# Patient Record
Sex: MALE | Race: WHITE | Employment: OTHER | URBAN - METROPOLITAN AREA
[De-identification: names, ages, dates, MRNs, and addresses within clinical notes are randomized per-mention and may not be internally consistent; named-entity substitution may affect disease eponyms.]

---

## 2021-06-11 ENCOUNTER — OFFICE VISIT (OUTPATIENT)
Dept: ORTHOPEDIC SURGERY | Age: 66
End: 2021-06-11
Payer: MEDICARE

## 2021-06-11 VITALS — BODY MASS INDEX: 24.66 KG/M2 | HEIGHT: 65 IN | WEIGHT: 148 LBS

## 2021-06-11 DIAGNOSIS — M17.12 OSTEOARTHRITIS OF LEFT KNEE, UNSPECIFIED OSTEOARTHRITIS TYPE: ICD-10-CM

## 2021-06-11 DIAGNOSIS — M25.561 PAIN IN BOTH KNEES, UNSPECIFIED CHRONICITY: Primary | ICD-10-CM

## 2021-06-11 DIAGNOSIS — M17.11 OSTEOARTHRITIS OF RIGHT KNEE, UNSPECIFIED OSTEOARTHRITIS TYPE: ICD-10-CM

## 2021-06-11 DIAGNOSIS — M25.562 PAIN IN BOTH KNEES, UNSPECIFIED CHRONICITY: Primary | ICD-10-CM

## 2021-06-11 PROCEDURE — G8536 NO DOC ELDER MAL SCRN: HCPCS | Performed by: ORTHOPAEDIC SURGERY

## 2021-06-11 PROCEDURE — G8427 DOCREV CUR MEDS BY ELIG CLIN: HCPCS | Performed by: ORTHOPAEDIC SURGERY

## 2021-06-11 PROCEDURE — 99203 OFFICE O/P NEW LOW 30 MIN: CPT | Performed by: ORTHOPAEDIC SURGERY

## 2021-06-11 PROCEDURE — 1101F PT FALLS ASSESS-DOCD LE1/YR: CPT | Performed by: ORTHOPAEDIC SURGERY

## 2021-06-11 PROCEDURE — G8420 CALC BMI NORM PARAMETERS: HCPCS | Performed by: ORTHOPAEDIC SURGERY

## 2021-06-11 PROCEDURE — 3017F COLORECTAL CA SCREEN DOC REV: CPT | Performed by: ORTHOPAEDIC SURGERY

## 2021-06-11 PROCEDURE — G8510 SCR DEP NEG, NO PLAN REQD: HCPCS | Performed by: ORTHOPAEDIC SURGERY

## 2021-06-11 PROCEDURE — 20611 DRAIN/INJ JOINT/BURSA W/US: CPT | Performed by: ORTHOPAEDIC SURGERY

## 2021-06-11 RX ORDER — TRIAMCINOLONE ACETONIDE 40 MG/ML
40 INJECTION, SUSPENSION INTRA-ARTICULAR; INTRAMUSCULAR ONCE
Status: COMPLETED | OUTPATIENT
Start: 2021-06-11 | End: 2021-06-11

## 2021-06-11 RX ORDER — LIDOCAINE HYDROCHLORIDE 10 MG/ML
9 INJECTION INFILTRATION; PERINEURAL ONCE
Status: COMPLETED | OUTPATIENT
Start: 2021-06-11 | End: 2021-06-11

## 2021-06-11 RX ADMIN — LIDOCAINE HYDROCHLORIDE 9 ML: 10 INJECTION INFILTRATION; PERINEURAL at 09:36

## 2021-06-11 RX ADMIN — TRIAMCINOLONE ACETONIDE 40 MG: 40 INJECTION, SUSPENSION INTRA-ARTICULAR; INTRAMUSCULAR at 09:37

## 2021-06-11 NOTE — LETTER
Robby Main 1955  
[id number]  
 
 
6/11/2021 I hereby authorize and direct Channing Springer MD, Fernando Feliz, and whomever he may designate as his associate to perform upon myself the following procedure: 
 
Injection of: Kenalog, Supartz, Euflexxa, Orthovisc in the Right/Left ____________________. If any unforeseen condition arises in the course of the procedure, I further authorize him and his associated and/or assistant(s) to do whatever he/she deems advisable. The nature, purpose, benefits, risks, side effects, likelihood of achieving goals, and potential problems that might occur during recuperation, risks for not receiving the proposed care, treatment and services and alternatives of the procedure have been fully explained to me by my physician including, but not limited to: 
 
Swelling, joint pain, skin pigment changes, worsening of condition, and failure to improve. I acknowledge that no guarantee or assurance has been made to me as to the results that may be obtained or the likelihood of success. _______________________________________ Signature of patient or authorized representative [employer] [facility] fax: [phone]

## 2021-06-11 NOTE — PROGRESS NOTES
Name: Jared Hoffman    : 1955     Service Dept: [facility]    Patient's Pharmacies:    [facility] [address]  [address]  [address]  Phone: [phone] Fax: [phone]       Chief Complaint   Patient presents with    Knee Pain        Visit Vitals  Ht 5' 5\" (1.651 m)   Wt 148 lb (67.1 kg)   BMI 24.63 kg/m²      No Known Allergies      There is no problem list on file for this patient. Family History   Problem Relation Age of Onset    Diabetes Mother       Social History     Socioeconomic History    Marital status: UNKNOWN     Spouse name: Not on file    Number of children: Not on file    Years of education: Not on file    Highest education level: Not on file   Tobacco Use    Smoking status: Never Smoker    Smokeless tobacco: Never Used   Substance and Sexual Activity    Alcohol use: Yes     Social Determinants of Health     Financial Resource Strain:     Difficulty of Paying Living Expenses:    Food Insecurity:     Worried About Running Out of Food in the Last Year:     [address] in the Last Year:    Transportation Needs:     Lack of Transportation (Medical):  Lack of Transportation (Non-Medical):    Physical Activity:     Days of Exercise per Week:     Minutes of Exercise per Session:    Stress:     Feeling of Stress :    Social Connections:     Frequency of Communication with Friends and Family:     Frequency of Social Gatherings with Friends and Family:     Attends Religion Services:     Active Member of Clubs or Organizations:     Attends Club or Organization Meetings:     Marital Status:       Past Surgical History:   Procedure Laterality Date    HX KNEE ARTHROSCOPY        History reviewed. No pertinent past medical history.      I have reviewed and agree with PFSH and ROS and intake form in chart and the record furthermore I have reviewed prior medical record(s) regarding this patients care during this appointment. Review of Systems:   Patient is a pleasant appearing individual, appropriately dressed, well hydrated, well nourished, who is alert, appropriately oriented for age, and in no acute distress with a normal gait and normal affect who does not appear to be in any significant pain. Physical Exam:  Left Knee -Decrease range of motion with flexion, Knee arc of greater than 50 degrees, Some crepitation, Grossly neurovascularly intact, Good cap refill, No skin lesion, Moderate swelling, No gross instability, Some quadriceps weakness Kellgren and Bo at least grade 3    Right Knee -Decrease range of motion with flexion, Some crepitation, Grossly neurovascularly intact, Good cap refill, No skin lesion, Moderate swelling, No gross instability, Some quadriceps weaknessKellgren and Bo at least grade 3    Procedure Documentation:    I discussed in detail the risks, benefits and complications of an injection which included but are not limited to infection, skin reactions, hot swollen joint, and anaphylaxis with the patient. The patient verbalized understanding and gave informed consent for the injection. The patient's knees were flexed to 90° and the skin prepped using sterile alcohol solution. A sterile needle was inserted into the bilateral knee(s) and the mixture of 9 mL Lidocaine 1%, 1 mL Kenalog 40 mg was injected under sterile technique. The needle was withdrawn and the puncture site sealed with a Band-Aid. Technique: Under sterile conditions a [employer] ultrasound unit with a variable frequency (7.0-14.0 MHz) linear transducer was used to localize the placement of needle into the bilateral knee(s) joint. Findings: Successful needle placement for knee injection. Final images were taken and saved for permanent record. The patient tolerated the injection well.  The patient was instructed to call the office immediately if there is any pain, redness, warmth, fever, or chills. Encounter Diagnoses     ICD-10-CM ICD-9-CM   1. Pain in both knees, unspecified chronicity  M25.561 719.46    M25.562    2. Osteoarthritis of right knee, unspecified osteoarthritis type  M17.11 715.96   3. Osteoarthritis of left knee, unspecified osteoarthritis type  M17.12 715.96       HPI:  The patient is here with a chief complaint of bilateral knee pain, throbbing burning pain, progressively getting worse. Pain is 6/10. ROS:  10-point review of systems is positive for locking and swelling. X-rays are positive for moderate to almost severe OA, especially in the right knee. Assessment/Plan:  1. Bilateral knee OA. Plan would be for cortisone injection in both knees. See him back in 4 weeks. If not better, we may talk to him about knee replacement and go from there. As part of continued conservative pain management options the patient was advised to utilize Tylenol or OTC NSAIDS as long as it is not medically contraindicated. Return to Office: Follow-up and Dispositions    · Return in about 4 weeks (around 7/9/2021). Administrations This Visit     lidocaine (XYLOCAINE) 10 mg/mL (1 %) injection 9 mL     Admin Date  06/11/2021 Action  Given Dose  9 mL Route  Other Administered By  Wing Carroll LPN    Admin Date  76/19/2737 Action  Given Dose  9 mL Route  Other Administered By  Wing Carroll LPN          triamcinolone acetonide (KENALOG-40) 40 mg/mL injection 40 mg     Admin Date  06/11/2021 Action  Given Dose  40 mg Route  Intra artICUlar Administered By  Wing Carroll LPN    Admin Date  74/48/3755 Action  Given Dose  40 mg Route  Intra artICUlar Administered By  Wing Carroll LPN               Scribed by Yani Escamilla LPN as dictated by [facility] DEACON Mcelroy MD.  Documentation True and Accepted Channing Mcelroy MD

## 2021-07-30 ENCOUNTER — OFFICE VISIT (OUTPATIENT)
Dept: ORTHOPEDIC SURGERY | Age: 66
End: 2021-07-30
Payer: MEDICARE

## 2021-07-30 VITALS — BODY MASS INDEX: 24.66 KG/M2 | WEIGHT: 148 LBS | HEIGHT: 65 IN

## 2021-07-30 DIAGNOSIS — Z01.818 PREOP TESTING: Primary | ICD-10-CM

## 2021-07-30 DIAGNOSIS — Z01.818 PREOP TESTING: ICD-10-CM

## 2021-07-30 DIAGNOSIS — M17.0 BILATERAL PRIMARY OSTEOARTHRITIS OF KNEE: ICD-10-CM

## 2021-07-30 PROCEDURE — G8536 NO DOC ELDER MAL SCRN: HCPCS | Performed by: ORTHOPAEDIC SURGERY

## 2021-07-30 PROCEDURE — G8420 CALC BMI NORM PARAMETERS: HCPCS | Performed by: ORTHOPAEDIC SURGERY

## 2021-07-30 PROCEDURE — G8427 DOCREV CUR MEDS BY ELIG CLIN: HCPCS | Performed by: ORTHOPAEDIC SURGERY

## 2021-07-30 PROCEDURE — G8432 DEP SCR NOT DOC, RNG: HCPCS | Performed by: ORTHOPAEDIC SURGERY

## 2021-07-30 PROCEDURE — 3017F COLORECTAL CA SCREEN DOC REV: CPT | Performed by: ORTHOPAEDIC SURGERY

## 2021-07-30 PROCEDURE — 99214 OFFICE O/P EST MOD 30 MIN: CPT | Performed by: ORTHOPAEDIC SURGERY

## 2021-07-30 PROCEDURE — 1101F PT FALLS ASSESS-DOCD LE1/YR: CPT | Performed by: ORTHOPAEDIC SURGERY

## 2021-07-30 NOTE — PATIENT INSTRUCTIONS
Knee Pain or Injury: Care Instructions  Your Care Instructions     Injuries are a common cause of knee problems. Sudden (acute) injuries may be caused by a direct blow to the knee. They can also be caused by abnormal twisting, bending, or falling on the knee. Pain, bruising, or swelling may be severe, and may start within minutes of the injury. Overuse is another cause of knee pain. Other causes are climbing stairs, kneeling, and other activities that use the knee. Everyday wear and tear, especially as you get older, also can cause knee pain. Rest, along with home treatment, often relieves pain and allows your knee to heal. If you have a serious knee injury, you may need tests and treatment. Follow-up care is a key part of your treatment and safety. Be sure to make and go to all appointments, and call your doctor if you are having problems. It's also a good idea to know your test results and keep a list of the medicines you take. How can you care for yourself at home? · Be safe with medicines. Read and follow all instructions on the label. ? If the doctor gave you a prescription medicine for pain, take it as prescribed. ? If you are not taking a prescription pain medicine, ask your doctor if you can take an over-the-counter medicine. · Rest and protect your knee. Take a break from any activity that may cause pain. · Put ice or a cold pack on your knee for 10 to 20 minutes at a time. Put a thin cloth between the ice and your skin. · Prop up a sore knee on a pillow when you ice it or anytime you sit or lie down for the next 3 days. Try to keep it above the level of your heart. This will help reduce swelling. · If your knee is not swollen, you can put moist heat, a heating pad, or a warm cloth on your knee. · If your doctor recommends an elastic bandage, sleeve, or other type of support for your knee, wear it as directed.   · Follow your doctor's instructions about how much weight you can put on your leg. Use a cane, crutches, or a walker as instructed. · Follow your doctor's instructions about activity during your healing process. If you can do mild exercise, slowly increase your activity. · Reach and stay at a healthy weight. Extra weight can strain the joints, especially the knees and hips, and make the pain worse. Losing even a few pounds may help. When should you call for help? Call 911 anytime you think you may need emergency care. For example, call if:    · You have symptoms of a blood clot in your lung (called a pulmonary embolism). These may include:  ? Sudden chest pain. ? Trouble breathing. ? Coughing up blood. Call your doctor now or seek immediate medical care if:    · You have severe or increasing pain.     · Your leg or foot turns cold or changes color.     · You cannot stand or put weight on your knee.     · Your knee looks twisted or bent out of shape.     · You cannot move your knee.     · You have signs of infection, such as:  ? Increased pain, swelling, warmth, or redness. ? Red streaks leading from the knee. ? Pus draining from a place on your knee. ? A fever.     · You have signs of a blood clot in your leg (called a deep vein thrombosis), such as:  ? Pain in your calf, back of the knee, thigh, or groin. ? Redness and swelling in your leg or groin. Watch closely for changes in your health, and be sure to contact your doctor if:    · You have tingling, weakness, or numbness in your knee.     · You have any new symptoms, such as swelling.     · You have bruises from a knee injury that last longer than 2 weeks.     · You do not get better as expected. Where can you learn more? Go to http://www.gray.com/  Enter K195 in the search box to learn more about \"Knee Pain or Injury: Care Instructions. \"  Current as of: February 26, 2020               Content Version: 12.8  © 8492-7961 [employer].    Care instructions adapted under license by [employer] (which disclaims liability or warranty for this information). If you have questions about a medical condition or this instruction, always ask your healthcare professional. [address] any warranty or liability for your use of this information.

## 2021-07-30 NOTE — PROGRESS NOTES
Name: Cass Pierce    : 1955     Service Dept: [facility]    Patient's Pharmacies:    [facility] [address]  [address] [address]  Phone: [phone] Fax: [phone]       Chief Complaint   Patient presents with    Knee Pain     b/l        Visit Vitals  Ht 5' 5\" (1.651 m)   Wt 148 lb (67.1 kg)   BMI 24.63 kg/m²      No Known Allergies      There is no problem list on file for this patient. Family History   Problem Relation Age of Onset    Diabetes Mother       Social History     Socioeconomic History    Marital status:      Spouse name: Not on file    Number of children: Not on file    Years of education: Not on file    Highest education level: Not on file   Tobacco Use    Smoking status: Never Smoker    Smokeless tobacco: Never Used   Substance and Sexual Activity    Alcohol use: Yes     Social Determinants of Health     Financial Resource Strain:     Difficulty of Paying Living Expenses:    Food Insecurity:     Worried About Running Out of Food in the Last Year:     [address] in the Last Year:    Transportation Needs:     Lack of Transportation (Medical):  Lack of Transportation (Non-Medical):    Physical Activity:     Days of Exercise per Week:     Minutes of Exercise per Session:    Stress:     Feeling of Stress :    Social Connections:     Frequency of Communication with Friends and Family:     Frequency of Social Gatherings with Friends and Family:     Attends Hinduism Services:     Active Member of Clubs or Organizations:     Attends Club or Organization Meetings:     Marital Status:       Past Surgical History:   Procedure Laterality Date    HX KNEE ARTHROSCOPY        No past medical history on file.      I have reviewed and agree with PFSH and ROS and intake form in chart and the record furthermore I have reviewed prior medical record(s) regarding this patients care during this appointment. Review of Systems:   Patient is a pleasant appearing individual, appropriately dressed, well hydrated, well nourished, who is alert, appropriately oriented for age, and in no acute distress with a normal gait and normal affect who does not appear to be in any significant pain. Physical Exam:  Left Knee -Decrease range of motion with flexion, Knee arc of greater than 50 degrees, Some crepitation, Grossly neurovascularly intact, Good cap refill, No skin lesion, Moderate swelling, No gross instability, Some quadriceps weakness Kellgren and Bo at least grade 3    Right Knee -Decrease range of motion with flexion, Some crepitation, Grossly neurovascularly intact, Good cap refill, No skin lesion, Moderate swelling, No gross instability, Some quadriceps weaknessKellgren and Bo at least grade 3   Encounter Diagnoses     ICD-10-CM ICD-9-CM   1. Preop testing  Z01.818 V72.84   2. Bilateral primary osteoarthritis of knee  M17.0 715.16       HPI:  The patient is here with a chief complaint of bilateral knee pain. Post x-rays which shows he has got moderate-to-severe OA. We gave a cortisone injection, and it is only transient. Assessment/Plan:  Plan would be for right total knee replacement, general medical and cardiac clearance, outpatient surgery, and go from there. As part of continued conservative pain management options the patient was advised to utilize Tylenol or OTC NSAIDS as long as it is not medically contraindicated. Return to Office:         Scribed by Royce Cochran as dictated by [facility] [place] DEACON Griffin MD.  Documentation True and Accepted Channing Griffin MD

## 2021-08-10 ENCOUNTER — TELEPHONE (OUTPATIENT)
Dept: INTERNAL MEDICINE CLINIC | Age: 66
End: 2021-08-10

## 2021-08-10 NOTE — TELEPHONE ENCOUNTER
Long time pt of Britney Styles is requesting to establish care with her at new practice . Says he is getting knee surgery in November and would like for her to do the pre op.  He wants know if he can do the establish care visit and pro op visit all in one

## 2021-09-22 LAB — CREATININE, EXTERNAL: 0.6

## 2021-10-11 DIAGNOSIS — Z01.818 PREOP TESTING: ICD-10-CM

## 2021-10-11 DIAGNOSIS — M17.0 BILATERAL PRIMARY OSTEOARTHRITIS OF KNEE: ICD-10-CM

## 2021-10-11 PROCEDURE — 93010 ELECTROCARDIOGRAM REPORT: CPT | Performed by: ORTHOPAEDIC SURGERY

## 2021-11-01 ENCOUNTER — OFFICE VISIT (OUTPATIENT)
Dept: INTERNAL MEDICINE CLINIC | Age: 66
End: 2021-11-01
Payer: MEDICARE

## 2021-11-01 VITALS
OXYGEN SATURATION: 97 % | SYSTOLIC BLOOD PRESSURE: 148 MMHG | WEIGHT: 152 LBS | DIASTOLIC BLOOD PRESSURE: 90 MMHG | TEMPERATURE: 98.2 F | HEIGHT: 65 IN | HEART RATE: 77 BPM | BODY MASS INDEX: 25.33 KG/M2 | RESPIRATION RATE: 16 BRPM

## 2021-11-01 DIAGNOSIS — F10.10 EXCESSIVE DRINKING OF ALCOHOL: ICD-10-CM

## 2021-11-01 DIAGNOSIS — R03.0 ELEVATED BLOOD PRESSURE READING: ICD-10-CM

## 2021-11-01 DIAGNOSIS — N52.8 OTHER MALE ERECTILE DYSFUNCTION: ICD-10-CM

## 2021-11-01 DIAGNOSIS — R23.3 EASY BRUISING: ICD-10-CM

## 2021-11-01 DIAGNOSIS — Z01.818 PREOPERATIVE EVALUATION TO RULE OUT SURGICAL CONTRAINDICATION: Primary | ICD-10-CM

## 2021-11-01 DIAGNOSIS — E78.00 PURE HYPERCHOLESTEROLEMIA: ICD-10-CM

## 2021-11-01 DIAGNOSIS — G47.00 INSOMNIA, UNSPECIFIED TYPE: ICD-10-CM

## 2021-11-01 DIAGNOSIS — Z12.5 SCREENING FOR PROSTATE CANCER: ICD-10-CM

## 2021-11-01 PROCEDURE — G8432 DEP SCR NOT DOC, RNG: HCPCS | Performed by: INTERNAL MEDICINE

## 2021-11-01 PROCEDURE — 3017F COLORECTAL CA SCREEN DOC REV: CPT | Performed by: INTERNAL MEDICINE

## 2021-11-01 PROCEDURE — 99215 OFFICE O/P EST HI 40 MIN: CPT | Performed by: INTERNAL MEDICINE

## 2021-11-01 PROCEDURE — G8536 NO DOC ELDER MAL SCRN: HCPCS | Performed by: INTERNAL MEDICINE

## 2021-11-01 PROCEDURE — G0463 HOSPITAL OUTPT CLINIC VISIT: HCPCS | Performed by: INTERNAL MEDICINE

## 2021-11-01 PROCEDURE — G8419 CALC BMI OUT NRM PARAM NOF/U: HCPCS | Performed by: INTERNAL MEDICINE

## 2021-11-01 PROCEDURE — 1101F PT FALLS ASSESS-DOCD LE1/YR: CPT | Performed by: INTERNAL MEDICINE

## 2021-11-01 PROCEDURE — G8427 DOCREV CUR MEDS BY ELIG CLIN: HCPCS | Performed by: INTERNAL MEDICINE

## 2021-11-01 RX ORDER — ASPIRIN 81 MG/1
81 TABLET ORAL DAILY
COMMUNITY
End: 2022-02-03
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
## 2021-11-01 NOTE — PROGRESS NOTES
Thuan Code male 77 y.o.       1. Have you been to the ER, urgent care clinic since your last visit? Hospitalized since your last visit? No    2. Have you seen or consulted any other health care providers outside of the [address] [place] [address] since your last visit? Include any pap smears or colon screening.  Yes When: Nov 18 Dr Kenna Lizarraga Replacement

## 2021-11-02 ENCOUNTER — TELEPHONE (OUTPATIENT)
Dept: INTERNAL MEDICINE CLINIC | Age: 66
End: 2021-11-02

## 2021-11-02 DIAGNOSIS — N52.8 OTHER MALE ERECTILE DYSFUNCTION: Primary | ICD-10-CM

## 2021-11-02 RX ORDER — SILDENAFIL 25 MG/1
TABLET, FILM COATED ORAL
Qty: 30 TABLET | Refills: 5 | Status: SHIPPED | OUTPATIENT
Start: 2021-11-02 | End: 2022-02-03

## 2021-11-02 NOTE — TELEPHONE ENCOUNTER
----- Message from Bebe Rahman sent at 11/2/2021 10:48 AM EDT -----  Patient requesting medication refill filbenafil 20mg [facility] [phone]. .. [facility]

## 2021-11-02 NOTE — TELEPHONE ENCOUNTER
Please let his ortho Dr. Zach Carrillo office know his preop clearance note has been done and is in system.

## 2021-11-02 NOTE — TELEPHONE ENCOUNTER
Please advise and pend new Rx if appropriate.     Last visit:  11/1/21 with MD Jim Inman  Next appt:  2/3/22 with MD Jim Inman

## 2021-11-08 DIAGNOSIS — M17.11 OSTEOARTHRITIS OF RIGHT KNEE, UNSPECIFIED OSTEOARTHRITIS TYPE: Primary | ICD-10-CM

## 2021-11-11 ENCOUNTER — OFFICE VISIT (OUTPATIENT)
Dept: ORTHOPEDIC SURGERY | Age: 66
End: 2021-11-11
Payer: MEDICARE

## 2021-11-11 ENCOUNTER — HOSPITAL ENCOUNTER (OUTPATIENT)
Dept: PREADMISSION TESTING | Age: 66
Discharge: HOME | End: 2021-11-11
Payer: MEDICARE

## 2021-11-11 ENCOUNTER — ANESTHESIA EVENT (OUTPATIENT)
Dept: SURGERY | Age: 66
End: 2021-11-11
Payer: MEDICARE

## 2021-11-11 DIAGNOSIS — M17.11 PRIMARY OSTEOARTHRITIS OF RIGHT KNEE: Primary | ICD-10-CM

## 2021-11-11 LAB — SARS-COV-2, COV2: NORMAL

## 2021-11-11 PROCEDURE — 1101F PT FALLS ASSESS-DOCD LE1/YR: CPT | Performed by: ORTHOPAEDIC SURGERY

## 2021-11-11 PROCEDURE — 99214 OFFICE O/P EST MOD 30 MIN: CPT | Performed by: ORTHOPAEDIC SURGERY

## 2021-11-11 PROCEDURE — 3017F COLORECTAL CA SCREEN DOC REV: CPT | Performed by: ORTHOPAEDIC SURGERY

## 2021-11-11 PROCEDURE — G8427 DOCREV CUR MEDS BY ELIG CLIN: HCPCS | Performed by: ORTHOPAEDIC SURGERY

## 2021-11-11 PROCEDURE — U0003 INFECTIOUS AGENT DETECTION BY NUCLEIC ACID (DNA OR RNA); SEVERE ACUTE RESPIRATORY SYNDROME CORONAVIRUS 2 (SARS-COV-2) (CORONAVIRUS DISEASE [COVID-19]), AMPLIFIED PROBE TECHNIQUE, MAKING USE OF HIGH THROUGHPUT TECHNOLOGIES AS DESCRIBED BY CMS-2020-01-R: HCPCS

## 2021-11-11 PROCEDURE — G8536 NO DOC ELDER MAL SCRN: HCPCS | Performed by: ORTHOPAEDIC SURGERY

## 2021-11-11 PROCEDURE — G8432 DEP SCR NOT DOC, RNG: HCPCS | Performed by: ORTHOPAEDIC SURGERY

## 2021-11-11 PROCEDURE — G8419 CALC BMI OUT NRM PARAM NOF/U: HCPCS | Performed by: ORTHOPAEDIC SURGERY

## 2021-11-11 RX ORDER — ONDANSETRON 4 MG/1
4 TABLET, FILM COATED ORAL
Qty: 10 TABLET | Refills: 0 | Status: SHIPPED | OUTPATIENT
Start: 2021-11-11 | End: 2022-02-03

## 2021-11-11 RX ORDER — SODIUM CHLORIDE 0.9 % (FLUSH) 0.9 %
5-40 SYRINGE (ML) INJECTION EVERY 8 HOURS
Status: CANCELLED | OUTPATIENT
Start: 2021-11-11

## 2021-11-11 RX ORDER — CEPHALEXIN 500 MG/1
500 CAPSULE ORAL EVERY 8 HOURS
Qty: 9 CAPSULE | Refills: 0 | Status: SHIPPED | OUTPATIENT
Start: 2021-11-11 | End: 2021-11-14

## 2021-11-11 RX ORDER — OXYCODONE AND ACETAMINOPHEN 5; 325 MG/1; MG/1
1 TABLET ORAL
Qty: 30 TABLET | Refills: 0 | Status: SHIPPED | OUTPATIENT
Start: 2021-11-11 | End: 2021-11-25

## 2021-11-11 RX ORDER — AMOXICILLIN 500 MG/1
500 CAPSULE ORAL 3 TIMES DAILY
COMMUNITY
Start: 2021-09-08 | End: 2022-02-03 | Stop reason: ALTCHOICE

## 2021-11-11 RX ORDER — CEPHALEXIN 500 MG/1
500 CAPSULE ORAL 4 TIMES DAILY
Qty: 28 CAPSULE | Refills: 0 | Status: ON HOLD | OUTPATIENT
Start: 2021-11-11 | End: 2021-11-18 | Stop reason: SDUPTHER

## 2021-11-11 RX ORDER — INSULIN LISPRO 100 [IU]/ML
INJECTION, SOLUTION INTRAVENOUS; SUBCUTANEOUS ONCE
Status: CANCELLED | OUTPATIENT
Start: 2021-11-11 | End: 2021-11-11

## 2021-11-11 NOTE — LETTER
11/12/2021    Patient: Kasia Burnette   YOB: 1955   Date of Visit: 11/11/2021     Emelia Morales MD  [address] [facility]  [address]  [address] [place] [address]  Via In Basket    Dear Emelia Morales MD,      Thank you for referring Mr. Tyson Call to [address]  for evaluation. My notes for this consultation are attached. If you have questions, please do not hesitate to call me. I look forward to following your patient along with you.       Sincerely,    Bridget Henson MD

## 2021-11-11 NOTE — PROGRESS NOTES
Name: Moises Lott    : 1955     Service Dept: [address] [place] [address] and Sports Medicine    Patient's Pharmacies:    [address] [place] [id number]  Phone: [phone] Fax: [phone]       Chief Complaint   Patient presents with    Pre-op Exam    Knee Pain        There were no vitals taken for this visit. No Known Allergies   Current Outpatient Medications   Medication Sig Dispense Refill    amoxicillin (AMOXIL) 500 mg capsule Take 500 mg by mouth three (3) times daily.  sildenafil citrate (VIAGRA) 25 mg tablet 1 or 2 p.o. 1 hour before attempted intercourse, limit 2 pills for 24 hours. 30 Tablet 5    aspirin delayed-release 81 mg tablet Take 81 mg by mouth daily. There is no problem list on file for this patient. Family History   Problem Relation Age of Onset    Diabetes Mother     Alzheimer's Disease Mother     Stroke Mother     Diabetes Father         type 2,  age 68      Social History     Socioeconomic History    Marital status:    Tobacco Use    Smoking status: Never Smoker    Smokeless tobacco: Never Used   Substance and Sexual Activity    Alcohol use: Yes      Past Surgical History:   Procedure Laterality Date    HX KNEE ARTHROSCOPY        No past medical history on file. I have reviewed and agree with [facility] and ROS and intake form in chart and the record furthermore I have reviewed prior medical record(s) regarding this patients care during this appointment. Review of Systems:   Patient is a pleasant appearing individual, appropriately dressed, well hydrated, well nourished, who is alert, appropriately oriented for age, and in no acute distress with a normal gait and normal affect who does not appear to be in any significant pain.      Physical Exam:  Right Knee -Decrease range of motion with flexion, Knee arc of greater than 50 degrees, Some crepitation, Grossly neurovascularly intact, Good cap refill, No skin lesion, Moderate swelling, No gross instability, Some quadriceps weakness, Kellgren and Bo at least grade 3    Left Knee - Full Range of Motion, No crepitation, Grossly neurovascularly intact, Good cap refill, No skin lesion, No swelling, No gross instability, No quadriceps weakness    Inpatient status: The patient has admitted to severe pain in the affected knee and due to such pain they are unable to complete activities of daily living at home and/or work on a regular basis where conservative treatments have failed. After extensive discussion with the patient, they have chosen to receive a total knee replacement with the expectation of inpatient procedure. Their dependent functional status (i.e. lack of capable support and safety at home, pain management, comorbities, or difficulty ambulating with assistive walking devices) would deem them a candidate for an inpatient stay. The patient acknowledges and understand the plan. The risks of surgery were explained to the patient which include but not limited to infection, nerve injury, artery injury, tendon injury, poor result, poor wound healing, unforeseen incidence, bleeding, infection, nerve damage, failure to improve, worsening of symptoms, morbidity, and mortality risks were explained. All questions were answered. Patient was told of no guarantees. Patient accepts all risks and benefits. A consent for surgery will be documented and signed by the patient or a legal guardian. All questions were answered. The procedure was explained in detail. The patient was counseled about the risks of clare Covid-19 during their perioperative period and any recovery window from their procedure. The patient was made aware that clare Covid-19 may worsen their prognosis for recovering from their procedure and lend to a higher morbidity and/or mortality risk.  All material risks, benefits, and reasonable alternatives including postponing the procedure were discussed. The patient DOES wish to proceed with their procedure at this time. Encounter Diagnoses     ICD-10-CM ICD-9-CM   1. Primary osteoarthritis of right knee  M17.11 715.16       HPI:  The patient is here with a chief complaint of right knee pain, dull, throbbing pain. It has been the same. Pain is 6/10. X-rays are positive for severe OA. Failed conservative treatment. Assessment/Plan:  Plan will be for right total knee replacement. He does have some scab and irritation. We are going to look at it in the virtual appointment the day before surgery just to make sure, and we will give him 7 days of Keflex. As part of continued conservative pain management options the patient was advised to utilize Tylenol or OTC NSAIDS as long as it is not medically contraindicated. Return to Office:   11/17/2021 VV at 3pm      Scribed by Yolanda Stone as dictated by Sindhu Velez MD.  Documentation True and Accepted Channing Velez MD

## 2021-11-11 NOTE — H&P (VIEW-ONLY)
Name: José Miguel Ríos    : 1955     Service Dept: [address] [place] [address] and Sports Medicine    Patient's Pharmacies:    [address] [place] [id number]  Phone: [phone] Fax: [phone]       Chief Complaint   Patient presents with    Pre-op Exam    Knee Pain        There were no vitals taken for this visit. No Known Allergies   Current Outpatient Medications   Medication Sig Dispense Refill    amoxicillin (AMOXIL) 500 mg capsule Take 500 mg by mouth three (3) times daily.  sildenafil citrate (VIAGRA) 25 mg tablet 1 or 2 p.o. 1 hour before attempted intercourse, limit 2 pills for 24 hours. 30 Tablet 5    aspirin delayed-release 81 mg tablet Take 81 mg by mouth daily. There is no problem list on file for this patient. Family History   Problem Relation Age of Onset    Diabetes Mother     Alzheimer's Disease Mother     Stroke Mother     Diabetes Father         type 2,  age 68      Social History     Socioeconomic History    Marital status:    Tobacco Use    Smoking status: Never Smoker    Smokeless tobacco: Never Used   Substance and Sexual Activity    Alcohol use: Yes      Past Surgical History:   Procedure Laterality Date    HX KNEE ARTHROSCOPY        No past medical history on file. I have reviewed and agree with [address] [place] [address] Nw and ROS and intake form in chart and the record furthermore I have reviewed prior medical record(s) regarding this patients care during this appointment. Review of Systems:   Patient is a pleasant appearing individual, appropriately dressed, well hydrated, well nourished, who is alert, appropriately oriented for age, and in no acute distress with a normal gait and normal affect who does not appear to be in any significant pain.      Physical Exam:  Right Knee -Decrease range of motion with flexion, Knee arc of greater than 50 degrees, Some crepitation, Grossly neurovascularly intact, Good cap refill, No skin lesion, Moderate swelling, No gross instability, Some quadriceps weakness, Kellgren and Bo at least grade 3    Left Knee - Full Range of Motion, No crepitation, Grossly neurovascularly intact, Good cap refill, No skin lesion, No swelling, No gross instability, No quadriceps weakness    Inpatient status: The patient has admitted to severe pain in the affected knee and due to such pain they are unable to complete activities of daily living at home and/or work on a regular basis where conservative treatments have failed. After extensive discussion with the patient, they have chosen to receive a total knee replacement with the expectation of inpatient procedure. Their dependent functional status (i.e. lack of capable support and safety at home, pain management, comorbities, or difficulty ambulating with assistive walking devices) would deem them a candidate for an inpatient stay. The patient acknowledges and understand the plan. The risks of surgery were explained to the patient which include but not limited to infection, nerve injury, artery injury, tendon injury, poor result, poor wound healing, unforeseen incidence, bleeding, infection, nerve damage, failure to improve, worsening of symptoms, morbidity, and mortality risks were explained. All questions were answered. Patient was told of no guarantees. Patient accepts all risks and benefits. A consent for surgery will be documented and signed by the patient or a legal guardian. All questions were answered. The procedure was explained in detail. The patient was counseled about the risks of clare Covid-19 during their perioperative period and any recovery window from their procedure. The patient was made aware that clare Covid-19 may worsen their prognosis for recovering from their procedure and lend to a higher morbidity and/or mortality risk.  All material risks, benefits, and reasonable alternatives including postponing the procedure were discussed. The patient DOES wish to proceed with their procedure at this time. Encounter Diagnoses     ICD-10-CM ICD-9-CM   1. Primary osteoarthritis of right knee  M17.11 715.16       HPI:  The patient is here with a chief complaint of right knee pain, dull, throbbing pain. It has been the same. Pain is 6/10. X-rays are positive for severe OA. Failed conservative treatment. Assessment/Plan:  Plan will be for right total knee replacement. He does have some scab and irritation. We are going to look at it in the virtual appointment the day before surgery just to make sure, and we will give him 7 days of Keflex. As part of continued conservative pain management options the patient was advised to utilize Tylenol or OTC NSAIDS as long as it is not medically contraindicated. Return to Office:   11/17/2021 VV at 3pm      Scribed by Kimber Yoo as dictated by Anjelica Young. Luigi Maya MD.  Documentation True and Accepted Channing Maya MD

## 2021-11-11 NOTE — PATIENT INSTRUCTIONS
Knee Arthritis: Care Instructions  Your Care Instructions     Knee arthritis is a breakdown of the cartilage that cushions your knee joint. When the cartilage wears down, your bones rub against each other. This causes pain and stiffness. Knee arthritis tends to get worse with time. Treatment for knee arthritis involves reducing pain, making the leg muscles stronger, and staying at a healthy body weight. The treatment usually does not improve the health of the cartilage, but it can reduce pain and improve how well your knee works. You can take simple measures to protect your knee joints, ease your pain, and help you stay active. Follow-up care is a key part of your treatment and safety. Be sure to make and go to all appointments, and call your doctor if you are having problems. It's also a good idea to know your test results and keep a list of the medicines you take. How can you care for yourself at home? · Know that knee arthritis will cause more pain on some days than on others. · Stay at a healthy weight. Lose weight if you are overweight. When you stand up, the pressure on your knees from every pound of body weight is multiplied four times. So if you lose 10 pounds, you will reduce the pressure on your knees by 40 pounds. · Talk to your doctor or physical therapist about exercises that will help ease joint pain. ? Stretch to help prevent stiffness and to prevent injury before you exercise. You may enjoy gentle forms of yoga to help keep your knee joints and muscles flexible. ? Walk instead of jog.  ? Ride a bike. This makes your thigh muscles stronger and takes pressure off your knee. ? Wear well-fitting and comfortable shoes. ? Exercise in chest-deep water. This can help you exercise longer with less pain. ? Avoid exercises that include squatting or kneeling. They can put a lot of strain on your knees.   ? Talk to your doctor to make sure that the exercise you do is not making the arthritis worse.  · Do not sit for long periods of time. Try to walk once in a while to keep your knee from getting stiff. · Ask your doctor or physical therapist whether shoe inserts may reduce your arthritis pain. · If you can afford it, get new athletic shoes at least every year. This can help reduce the strain on your knees. · Use a device to help you do everyday activities. ? A cane or walking stick can help you keep your balance when you walk. Hold the cane or walking stick in the hand opposite the painful knee. ? If you feel like you may fall when you walk, try using crutches or a front-wheeled walker. These can prevent falls that could cause more damage to your knee. ? A knee brace may help keep your knee stable and prevent pain. ? You also can use other things to make life easier, such as a higher toilet seat and handrails in the bathtub or shower. · Take pain medicines exactly as directed. ? Do not wait until you are in severe pain. You will get better results if you take it sooner. ? If you are not taking a prescription pain medicine, take an over-the-counter medicine such as acetaminophen (Tylenol), ibuprofen (Advil, Motrin), or naproxen (Aleve). Read and follow all instructions on the label. ? Do not take two or more pain medicines at the same time unless the doctor told you to. Many pain medicines have acetaminophen, which is Tylenol. Too much acetaminophen (Tylenol) can be harmful. ? Tell your doctor if you take a blood thinner, have diabetes, or have allergies to shellfish. · Ask your doctor if you might benefit from a shot of steroid medicine into your knee. This may provide pain relief for several months. · Many people take the supplements glucosamine and chondroitin for osteoarthritis. Some people feel they help, but the medical research does not show that they work. Talk to your doctor before you take these supplements. When should you call for help?    Call your doctor now or seek immediate medical care if:    · You have sudden swelling, warmth, or pain in your knee.     · You have knee pain and a fever or rash.     · You have such bad pain that you cannot use your knee. Watch closely for changes in your health, and be sure to contact your doctor if you have any problems. Where can you learn more? Go to http://www.gray.com/  Enter W187 in the search box to learn more about \"Knee Arthritis: Care Instructions. \"  Current as of: April 30, 2021               Content Version: 13.0  © 5422-7218 [employer]. Care instructions adapted under license by [employer] (which disclaims liability or warranty for this information). If you have questions about a medical condition or this instruction, always ask your healthcare professional. [address] any warranty or liability for your use of this information.

## 2021-11-12 LAB — SARS-COV-2, NAA: NOT DETECTED

## 2021-11-17 ENCOUNTER — VIRTUAL VISIT (OUTPATIENT)
Dept: ORTHOPEDIC SURGERY | Age: 66
End: 2021-11-17
Payer: MEDICARE

## 2021-11-17 DIAGNOSIS — M25.561 RIGHT KNEE PAIN, UNSPECIFIED CHRONICITY: Primary | ICD-10-CM

## 2021-11-17 PROCEDURE — G8419 CALC BMI OUT NRM PARAM NOF/U: HCPCS | Performed by: ORTHOPAEDIC SURGERY

## 2021-11-17 PROCEDURE — 3017F COLORECTAL CA SCREEN DOC REV: CPT | Performed by: ORTHOPAEDIC SURGERY

## 2021-11-17 PROCEDURE — G8427 DOCREV CUR MEDS BY ELIG CLIN: HCPCS | Performed by: ORTHOPAEDIC SURGERY

## 2021-11-17 PROCEDURE — 1101F PT FALLS ASSESS-DOCD LE1/YR: CPT | Performed by: ORTHOPAEDIC SURGERY

## 2021-11-17 PROCEDURE — 99213 OFFICE O/P EST LOW 20 MIN: CPT | Performed by: ORTHOPAEDIC SURGERY

## 2021-11-17 PROCEDURE — G8536 NO DOC ELDER MAL SCRN: HCPCS | Performed by: ORTHOPAEDIC SURGERY

## 2021-11-17 PROCEDURE — G8432 DEP SCR NOT DOC, RNG: HCPCS | Performed by: ORTHOPAEDIC SURGERY

## 2021-11-17 NOTE — PATIENT INSTRUCTIONS
Knee Arthritis: Care Instructions  Your Care Instructions     Knee arthritis is a breakdown of the cartilage that cushions your knee joint. When the cartilage wears down, your bones rub against each other. This causes pain and stiffness. Knee arthritis tends to get worse with time. Treatment for knee arthritis involves reducing pain, making the leg muscles stronger, and staying at a healthy body weight. The treatment usually does not improve the health of the cartilage, but it can reduce pain and improve how well your knee works. You can take simple measures to protect your knee joints, ease your pain, and help you stay active. Follow-up care is a key part of your treatment and safety. Be sure to make and go to all appointments, and call your doctor if you are having problems. It's also a good idea to know your test results and keep a list of the medicines you take. How can you care for yourself at home? · Know that knee arthritis will cause more pain on some days than on others. · Stay at a healthy weight. Lose weight if you are overweight. When you stand up, the pressure on your knees from every pound of body weight is multiplied four times. So if you lose 10 pounds, you will reduce the pressure on your knees by 40 pounds. · Talk to your doctor or physical therapist about exercises that will help ease joint pain. ? Stretch to help prevent stiffness and to prevent injury before you exercise. You may enjoy gentle forms of yoga to help keep your knee joints and muscles flexible. ? Walk instead of jog.  ? Ride a bike. This makes your thigh muscles stronger and takes pressure off your knee. ? Wear well-fitting and comfortable shoes. ? Exercise in chest-deep water. This can help you exercise longer with less pain. ? Avoid exercises that include squatting or kneeling. They can put a lot of strain on your knees.   ? Talk to your doctor to make sure that the exercise you do is not making the arthritis worse.  · Do not sit for long periods of time. Try to walk once in a while to keep your knee from getting stiff. · Ask your doctor or physical therapist whether shoe inserts may reduce your arthritis pain. · If you can afford it, get new athletic shoes at least every year. This can help reduce the strain on your knees. · Use a device to help you do everyday activities. ? A cane or walking stick can help you keep your balance when you walk. Hold the cane or walking stick in the hand opposite the painful knee. ? If you feel like you may fall when you walk, try using crutches or a front-wheeled walker. These can prevent falls that could cause more damage to your knee. ? A knee brace may help keep your knee stable and prevent pain. ? You also can use other things to make life easier, such as a higher toilet seat and handrails in the bathtub or shower. · Take pain medicines exactly as directed. ? Do not wait until you are in severe pain. You will get better results if you take it sooner. ? If you are not taking a prescription pain medicine, take an over-the-counter medicine such as acetaminophen (Tylenol), ibuprofen (Advil, Motrin), or naproxen (Aleve). Read and follow all instructions on the label. ? Do not take two or more pain medicines at the same time unless the doctor told you to. Many pain medicines have acetaminophen, which is Tylenol. Too much acetaminophen (Tylenol) can be harmful. ? Tell your doctor if you take a blood thinner, have diabetes, or have allergies to shellfish. · Ask your doctor if you might benefit from a shot of steroid medicine into your knee. This may provide pain relief for several months. · Many people take the supplements glucosamine and chondroitin for osteoarthritis. Some people feel they help, but the medical research does not show that they work. Talk to your doctor before you take these supplements. When should you call for help?    Call your doctor now or seek immediate medical care if:    · You have sudden swelling, warmth, or pain in your knee.     · You have knee pain and a fever or rash.     · You have such bad pain that you cannot use your knee. Watch closely for changes in your health, and be sure to contact your doctor if you have any problems. Where can you learn more? Go to http://www.gray.com/  Enter W187 in the search box to learn more about \"Knee Arthritis: Care Instructions. \"  Current as of: April 30, 2021               Content Version: 13.0  © 4473-5936 [employer]. Care instructions adapted under license by [employer] (which disclaims liability or warranty for this information). If you have questions about a medical condition or this instruction, always ask your healthcare professional. [address] any warranty or liability for your use of this information.

## 2021-11-17 NOTE — PROGRESS NOTES
Name: Angelia Woods    : 1955     Service Dept: [address] [place] [address] and Sports Medicine    Patient's Pharmacies:    [facility], [facility].  [address] [facility] [address]  Phone: [phone] Fax: [phone]. [address] [address]  Phone: [phone] Fax: [phone]       Chief Complaint   Patient presents with    Knee Pain        There were no vitals taken for this visit. No Known Allergies     Current Outpatient Medications   Medication Sig Dispense Refill    amoxicillin (AMOXIL) 500 mg capsule Take 500 mg by mouth three (3) times daily.  oxyCODONE-acetaminophen (PERCOCET) 5-325 mg per tablet Take 1 Tablet by mouth every four to six (4-6) hours as needed for Pain for up to 14 days. Max Daily Amount: 6 Tablets. 30 Tablet 0    ondansetron hcl (ZOFRAN) 4 mg tablet Take 1 Tablet by mouth every eight (8) hours as needed for Nausea or Vomiting. 10 Tablet 0    cephALEXin (KEFLEX) 500 mg capsule Take 1 Capsule by mouth four (4) times daily for 7 days. 28 Capsule 0    sildenafil citrate (VIAGRA) 25 mg tablet 1 or 2 p.o. 1 hour before attempted intercourse, limit 2 pills for 24 hours. 30 Tablet 5    aspirin delayed-release 81 mg tablet Take 81 mg by mouth daily. There is no problem list on file for this patient.        Family History   Problem Relation Age of Onset    Diabetes Mother     Alzheimer's Disease Mother     Stroke Mother     Diabetes Father         type 2,  age 68        Social History     Socioeconomic History    Marital status:    Tobacco Use    Smoking status: Never Smoker    Smokeless tobacco: Never Used   Substance and Sexual Activity    Alcohol use: Yes        Past Surgical History:   Procedure Laterality Date    HX KNEE ARTHROSCOPY History reviewed. No pertinent past medical history. I have reviewed and agree with [facility] and ROS and intake form in chart and the record. Encounter Diagnoses     ICD-10-CM ICD-9-CM   1. Right knee pain, unspecified chronicity  M25.561 719.46       HPI:  The patient is here status post right knee pain, diagnosed with osteoarthritis. He had a scab in his leg, and we have been going back and forth assessing how his wound appeared. Last pictures assessed. He has got a scab on that side, but otherwise he has no other new lesions and no other change in the physical examination for his right knee. He has no swelling and no other lesions. Pain is 2/10. Assessment/Plan:  Plan will be for right total knee replacement. We did tell him not to get any other cuts or abrasions overnight. Master Ortiz, who was evaluated through a synchronous (real-time) audio-video encounter, and/or his healthcare decision maker, is aware that it is a billable service, with coverage as determined by his insurance carrier. He provided verbal consent to proceed: Yes, and patient identification was verified. It was conducted pursuant to the emergency declaration under the [facility], [address] [place] [address] authority and the [employer] Act. A caregiver was present when appropriate. Ability to conduct physical exam was limited. I was in the office. The patient was at home. Return to Office: Follow-up and Dispositions    · Return for AS SCHEDULED. Scribed by Stanislav Wagner as dictated by Charlene Vogel. Phyllis Blood MD.    Documentation True and Accepted Channing Blood MD

## 2021-11-18 ENCOUNTER — ANESTHESIA (OUTPATIENT)
Dept: SURGERY | Age: 66
End: 2021-11-18
Payer: MEDICARE

## 2021-11-18 ENCOUNTER — APPOINTMENT (OUTPATIENT)
Dept: GENERAL RADIOLOGY | Age: 66
End: 2021-11-18
Attending: NURSE PRACTITIONER
Payer: MEDICARE

## 2021-11-18 ENCOUNTER — HOSPITAL ENCOUNTER (OUTPATIENT)
Age: 66
Discharge: HOME | End: 2021-11-18
Attending: ORTHOPAEDIC SURGERY | Admitting: NURSE PRACTITIONER
Payer: MEDICARE

## 2021-11-18 VITALS
HEIGHT: 65 IN | HEART RATE: 78 BPM | OXYGEN SATURATION: 99 % | WEIGHT: 149 LBS | RESPIRATION RATE: 17 BRPM | BODY MASS INDEX: 24.83 KG/M2 | DIASTOLIC BLOOD PRESSURE: 83 MMHG | TEMPERATURE: 97 F | SYSTOLIC BLOOD PRESSURE: 144 MMHG

## 2021-11-18 PROBLEM — M17.9 KNEE OSTEOARTHRITIS: Status: ACTIVE | Noted: 2021-11-18

## 2021-11-18 LAB
ABO + RH BLD: NORMAL
BLOOD GROUP ANTIBODIES SERPL: NEGATIVE
SPECIMEN EXP DATE BLD: NORMAL

## 2021-11-18 PROCEDURE — 74011000250 HC RX REV CODE- 250: Performed by: ORTHOPAEDIC SURGERY

## 2021-11-18 PROCEDURE — 77030018673: Performed by: ORTHOPAEDIC SURGERY

## 2021-11-18 PROCEDURE — 77030041690 HC SYS PINNING KN JNJ -D: Performed by: ORTHOPAEDIC SURGERY

## 2021-11-18 PROCEDURE — 77030029372 HC ADH SKN CLSR PRINEO J&J -C: Performed by: ORTHOPAEDIC SURGERY

## 2021-11-18 PROCEDURE — 2709999900 HC NON-CHARGEABLE SUPPLY: Performed by: ORTHOPAEDIC SURGERY

## 2021-11-18 PROCEDURE — 77030041075 HC DRSG AG OPTIFRM MDII -B: Performed by: ORTHOPAEDIC SURGERY

## 2021-11-18 PROCEDURE — 77030040375: Performed by: ORTHOPAEDIC SURGERY

## 2021-11-18 PROCEDURE — 76210000063 HC OR PH I REC FIRST 0.5 HR: Performed by: ORTHOPAEDIC SURGERY

## 2021-11-18 PROCEDURE — C1776 JOINT DEVICE (IMPLANTABLE): HCPCS | Performed by: ORTHOPAEDIC SURGERY

## 2021-11-18 PROCEDURE — 77030013708 HC HNDPC SUC IRR PULS STRY –B: Performed by: ORTHOPAEDIC SURGERY

## 2021-11-18 PROCEDURE — 77030006812 HC BLD SAW RECIP STRY -B: Performed by: ORTHOPAEDIC SURGERY

## 2021-11-18 PROCEDURE — 77030003666 HC NDL SPINAL BD -A: Performed by: NURSE ANESTHETIST, CERTIFIED REGISTERED

## 2021-11-18 PROCEDURE — 74011250636 HC RX REV CODE- 250/636: Performed by: NURSE ANESTHETIST, CERTIFIED REGISTERED

## 2021-11-18 PROCEDURE — 86901 BLOOD TYPING SEROLOGIC RH(D): CPT

## 2021-11-18 PROCEDURE — 74011000272 HC RX REV CODE- 272: Performed by: ORTHOPAEDIC SURGERY

## 2021-11-18 PROCEDURE — 74011250636 HC RX REV CODE- 250/636: Performed by: NURSE PRACTITIONER

## 2021-11-18 PROCEDURE — 97161 PT EVAL LOW COMPLEX 20 MIN: CPT

## 2021-11-18 PROCEDURE — 77030006835 HC BLD SAW SAG STRY -B: Performed by: ORTHOPAEDIC SURGERY

## 2021-11-18 PROCEDURE — 64450 NJX AA&/STRD OTHER PN/BRANCH: CPT | Performed by: NURSE ANESTHETIST, CERTIFIED REGISTERED

## 2021-11-18 PROCEDURE — 77030007866 HC KT SPN ANES BBMI -B: Performed by: NURSE ANESTHETIST, CERTIFIED REGISTERED

## 2021-11-18 PROCEDURE — C1713 ANCHOR/SCREW BN/BN,TIS/BN: HCPCS | Performed by: ORTHOPAEDIC SURGERY

## 2021-11-18 PROCEDURE — 77030010783 HC BOWL MX BN CEM J&J -B: Performed by: ORTHOPAEDIC SURGERY

## 2021-11-18 PROCEDURE — 62270 DX LMBR SPI PNXR: CPT | Performed by: NURSE ANESTHETIST, CERTIFIED REGISTERED

## 2021-11-18 PROCEDURE — 97116 GAIT TRAINING THERAPY: CPT

## 2021-11-18 PROCEDURE — 74011000258 HC RX REV CODE- 258: Performed by: NURSE ANESTHETIST, CERTIFIED REGISTERED

## 2021-11-18 PROCEDURE — 77030031139 HC SUT VCRL2 J&J -A: Performed by: ORTHOPAEDIC SURGERY

## 2021-11-18 PROCEDURE — 76942 ECHO GUIDE FOR BIOPSY: CPT | Performed by: NURSE ANESTHETIST, CERTIFIED REGISTERED

## 2021-11-18 PROCEDURE — 76060000034 HC ANESTHESIA 1.5 TO 2 HR: Performed by: ORTHOPAEDIC SURGERY

## 2021-11-18 PROCEDURE — 77030040361 HC SLV COMPR DVT MDII -B: Performed by: ORTHOPAEDIC SURGERY

## 2021-11-18 PROCEDURE — 77030002933 HC SUT MCRYL J&J -A: Performed by: ORTHOPAEDIC SURGERY

## 2021-11-18 PROCEDURE — 77030011266 HC ELECTRD BLD INSL COVD -A: Performed by: ORTHOPAEDIC SURGERY

## 2021-11-18 PROCEDURE — 74011000250 HC RX REV CODE- 250: Performed by: NURSE ANESTHETIST, CERTIFIED REGISTERED

## 2021-11-18 PROCEDURE — 73560 X-RAY EXAM OF KNEE 1 OR 2: CPT

## 2021-11-18 PROCEDURE — 77030013079 HC BLNKT BAIR HGGR 3M -A: Performed by: NURSE ANESTHETIST, CERTIFIED REGISTERED

## 2021-11-18 PROCEDURE — 77030038692 HC WND DEB SYS IRMX -B: Performed by: ORTHOPAEDIC SURGERY

## 2021-11-18 PROCEDURE — 74011250637 HC RX REV CODE- 250/637: Performed by: NURSE PRACTITIONER

## 2021-11-18 PROCEDURE — 74011250637 HC RX REV CODE- 250/637: Performed by: NURSE ANESTHETIST, CERTIFIED REGISTERED

## 2021-11-18 PROCEDURE — 76210000022 HC REC RM PH II 1.5 TO 2 HR: Performed by: ORTHOPAEDIC SURGERY

## 2021-11-18 PROCEDURE — 76010000153 HC OR TIME 1.5 TO 2 HR: Performed by: ORTHOPAEDIC SURGERY

## 2021-11-18 PROCEDURE — 77030002982 HC SUT POLYSRB J&J -A: Performed by: ORTHOPAEDIC SURGERY

## 2021-11-18 PROCEDURE — 77030000032 HC CUF TRNQT ZIMM -B: Performed by: ORTHOPAEDIC SURGERY

## 2021-11-18 DEVICE — COMPONENT TIB SZ 6 CEM BASE ROT PLATFRM KNEE SYS ATTUNE: Type: IMPLANTABLE DEVICE | Site: KNEE | Status: FUNCTIONAL

## 2021-11-18 DEVICE — INSERT TIB SZ 6 THK5MM KNEE POST STBL ROT PLATFRM ATTUNE: Type: IMPLANTABLE DEVICE | Site: KNEE | Status: FUNCTIONAL

## 2021-11-18 DEVICE — CEMENT BNE 40GM FULL DOSE PMMA W/ GENT HI VISC RADPQ LNG: Type: IMPLANTABLE DEVICE | Site: KNEE | Status: FUNCTIONAL

## 2021-11-18 DEVICE — KNEE K1 TOT HEMI STD CEM IMPL CAPPED SYNTHES: Type: IMPLANTABLE DEVICE | Site: KNEE | Status: FUNCTIONAL

## 2021-11-18 DEVICE — COMPONENT FEM SZ 6 R KNEE NAR POST STBL CEM ATTUNE: Type: IMPLANTABLE DEVICE | Site: KNEE | Status: FUNCTIONAL

## 2021-11-18 DEVICE — COMPONENT PAT DIA38MM POLYETH DOME CEM MEDIALIZED ATTUNE: Type: IMPLANTABLE DEVICE | Site: KNEE | Status: FUNCTIONAL

## 2021-11-18 RX ORDER — SODIUM CHLORIDE 0.9 % (FLUSH) 0.9 %
5-40 SYRINGE (ML) INJECTION AS NEEDED
Status: CANCELLED | OUTPATIENT
Start: 2021-11-18

## 2021-11-18 RX ORDER — ACETAMINOPHEN 325 MG/1
650 TABLET ORAL
Status: CANCELLED | OUTPATIENT
Start: 2021-11-18

## 2021-11-18 RX ORDER — CEFAZOLIN SODIUM IN 0.9 % NACL 2 G/100 ML
2 PLASTIC BAG, INJECTION (ML) INTRAVENOUS EVERY 8 HOURS
Status: CANCELLED | OUTPATIENT
Start: 2021-11-18 | End: 2021-11-18

## 2021-11-18 RX ORDER — BUPIVACAINE HYDROCHLORIDE 2.5 MG/ML
INJECTION, SOLUTION INFILTRATION; PERINEURAL AS NEEDED
Status: DISCONTINUED | OUTPATIENT
Start: 2021-11-18 | End: 2021-11-18 | Stop reason: HOSPADM

## 2021-11-18 RX ORDER — GABAPENTIN 300 MG/1
300 CAPSULE ORAL ONCE
Status: COMPLETED | OUTPATIENT
Start: 2021-11-18 | End: 2021-11-18

## 2021-11-18 RX ORDER — SODIUM CHLORIDE, SODIUM LACTATE, POTASSIUM CHLORIDE, CALCIUM CHLORIDE 600; 310; 30; 20 MG/100ML; MG/100ML; MG/100ML; MG/100ML
25 INJECTION, SOLUTION INTRAVENOUS CONTINUOUS
Status: DISCONTINUED | OUTPATIENT
Start: 2021-11-18 | End: 2021-11-18 | Stop reason: HOSPADM

## 2021-11-18 RX ORDER — EPHEDRINE SULFATE/0.9% NACL/PF 50 MG/5 ML
SYRINGE (ML) INTRAVENOUS AS NEEDED
Status: DISCONTINUED | OUTPATIENT
Start: 2021-11-18 | End: 2021-11-18 | Stop reason: HOSPADM

## 2021-11-18 RX ORDER — DEXAMETHASONE SODIUM PHOSPHATE 4 MG/ML
INJECTION, SOLUTION INTRA-ARTICULAR; INTRALESIONAL; INTRAMUSCULAR; INTRAVENOUS; SOFT TISSUE
Status: SHIPPED | OUTPATIENT
Start: 2021-11-18 | End: 2021-11-18

## 2021-11-18 RX ORDER — MIDAZOLAM HYDROCHLORIDE 1 MG/ML
INJECTION, SOLUTION INTRAMUSCULAR; INTRAVENOUS
Status: SHIPPED | OUTPATIENT
Start: 2021-11-18 | End: 2021-11-18

## 2021-11-18 RX ORDER — LIDOCAINE HYDROCHLORIDE 20 MG/ML
INJECTION, SOLUTION EPIDURAL; INFILTRATION; INTRACAUDAL; PERINEURAL AS NEEDED
Status: DISCONTINUED | OUTPATIENT
Start: 2021-11-18 | End: 2021-11-18 | Stop reason: HOSPADM

## 2021-11-18 RX ORDER — DIPHENHYDRAMINE HYDROCHLORIDE 50 MG/ML
12.5 INJECTION, SOLUTION INTRAMUSCULAR; INTRAVENOUS
Status: DISCONTINUED | OUTPATIENT
Start: 2021-11-18 | End: 2021-11-18 | Stop reason: HOSPADM

## 2021-11-18 RX ORDER — ASPIRIN 325 MG
325 TABLET, DELAYED RELEASE (ENTERIC COATED) ORAL 2 TIMES DAILY
Status: CANCELLED | OUTPATIENT
Start: 2021-11-19

## 2021-11-18 RX ORDER — ONDANSETRON 2 MG/ML
4 INJECTION INTRAMUSCULAR; INTRAVENOUS
Status: DISCONTINUED | OUTPATIENT
Start: 2021-11-18 | End: 2021-11-18 | Stop reason: HOSPADM

## 2021-11-18 RX ORDER — BUPIVACAINE HYDROCHLORIDE 7.5 MG/ML
INJECTION, SOLUTION INTRASPINAL
Status: SHIPPED | OUTPATIENT
Start: 2021-11-18 | End: 2021-11-18

## 2021-11-18 RX ORDER — SENNOSIDES 8.6 MG/1
1 TABLET ORAL 2 TIMES DAILY
Status: CANCELLED | OUTPATIENT
Start: 2021-11-18

## 2021-11-18 RX ORDER — BUPIVACAINE HYDROCHLORIDE 2.5 MG/ML
INJECTION, SOLUTION EPIDURAL; INFILTRATION; INTRACAUDAL
Status: SHIPPED | OUTPATIENT
Start: 2021-11-18 | End: 2021-11-18

## 2021-11-18 RX ORDER — DIPHENHYDRAMINE HYDROCHLORIDE 50 MG/ML
12.5 INJECTION, SOLUTION INTRAMUSCULAR; INTRAVENOUS
Status: CANCELLED | OUTPATIENT
Start: 2021-11-18

## 2021-11-18 RX ORDER — OXYCODONE AND ACETAMINOPHEN 5; 325 MG/1; MG/1
2 TABLET ORAL
Status: CANCELLED | OUTPATIENT
Start: 2021-11-18

## 2021-11-18 RX ORDER — SODIUM CHLORIDE, SODIUM LACTATE, POTASSIUM CHLORIDE, CALCIUM CHLORIDE 600; 310; 30; 20 MG/100ML; MG/100ML; MG/100ML; MG/100ML
25 INJECTION, SOLUTION INTRAVENOUS CONTINUOUS
Status: DISPENSED | OUTPATIENT
Start: 2021-11-18 | End: 2021-11-18

## 2021-11-18 RX ORDER — CEPHALEXIN 500 MG/1
500 CAPSULE ORAL 4 TIMES DAILY
Qty: 28 CAPSULE | Refills: 0 | Status: SHIPPED | OUTPATIENT
Start: 2021-11-18 | End: 2021-11-25

## 2021-11-18 RX ORDER — KETOROLAC TROMETHAMINE 30 MG/ML
INJECTION, SOLUTION INTRAMUSCULAR; INTRAVENOUS AS NEEDED
Status: DISCONTINUED | OUTPATIENT
Start: 2021-11-18 | End: 2021-11-18 | Stop reason: HOSPADM

## 2021-11-18 RX ORDER — ONDANSETRON 2 MG/ML
INJECTION INTRAMUSCULAR; INTRAVENOUS AS NEEDED
Status: DISCONTINUED | OUTPATIENT
Start: 2021-11-18 | End: 2021-11-18 | Stop reason: HOSPADM

## 2021-11-18 RX ORDER — PROPOFOL 10 MG/ML
INJECTION, EMULSION INTRAVENOUS
Status: DISCONTINUED | OUTPATIENT
Start: 2021-11-18 | End: 2021-11-18 | Stop reason: HOSPADM

## 2021-11-18 RX ORDER — FACIAL-BODY WIPES
10 EACH TOPICAL DAILY PRN
Status: CANCELLED | OUTPATIENT
Start: 2021-11-18

## 2021-11-18 RX ORDER — SODIUM CHLORIDE 0.9 % (FLUSH) 0.9 %
5-40 SYRINGE (ML) INJECTION AS NEEDED
Status: DISCONTINUED | OUTPATIENT
Start: 2021-11-18 | End: 2021-11-18 | Stop reason: HOSPADM

## 2021-11-18 RX ORDER — CELECOXIB 200 MG/1
400 CAPSULE ORAL
Status: COMPLETED | OUTPATIENT
Start: 2021-11-18 | End: 2021-11-18

## 2021-11-18 RX ORDER — ONDANSETRON 2 MG/ML
4 INJECTION INTRAMUSCULAR; INTRAVENOUS ONCE
Status: DISCONTINUED | OUTPATIENT
Start: 2021-11-18 | End: 2021-11-18 | Stop reason: HOSPADM

## 2021-11-18 RX ORDER — FLUMAZENIL 0.1 MG/ML
0.2 INJECTION INTRAVENOUS
Status: DISCONTINUED | OUTPATIENT
Start: 2021-11-18 | End: 2021-11-18 | Stop reason: HOSPADM

## 2021-11-18 RX ORDER — CEFAZOLIN SODIUM IN 0.9 % NACL 2 G/100 ML
2 PLASTIC BAG, INJECTION (ML) INTRAVENOUS ONCE
Status: COMPLETED | OUTPATIENT
Start: 2021-11-18 | End: 2021-11-18

## 2021-11-18 RX ORDER — ACETAMINOPHEN 500 MG
1000 TABLET ORAL ONCE
Status: COMPLETED | OUTPATIENT
Start: 2021-11-18 | End: 2021-11-18

## 2021-11-18 RX ORDER — SODIUM CHLORIDE 0.9 % (FLUSH) 0.9 %
5-40 SYRINGE (ML) INJECTION EVERY 8 HOURS
Status: CANCELLED | OUTPATIENT
Start: 2021-11-18

## 2021-11-18 RX ORDER — NALOXONE HYDROCHLORIDE 0.4 MG/ML
0.1 INJECTION, SOLUTION INTRAMUSCULAR; INTRAVENOUS; SUBCUTANEOUS
Status: DISCONTINUED | OUTPATIENT
Start: 2021-11-18 | End: 2021-11-18 | Stop reason: HOSPADM

## 2021-11-18 RX ORDER — FENTANYL CITRATE 50 UG/ML
50 INJECTION, SOLUTION INTRAMUSCULAR; INTRAVENOUS AS NEEDED
Status: DISCONTINUED | OUTPATIENT
Start: 2021-11-18 | End: 2021-11-18 | Stop reason: HOSPADM

## 2021-11-18 RX ORDER — OXYCODONE AND ACETAMINOPHEN 10; 325 MG/1; MG/1
1 TABLET ORAL
Status: DISCONTINUED | OUTPATIENT
Start: 2021-11-18 | End: 2021-11-18 | Stop reason: HOSPADM

## 2021-11-18 RX ORDER — NALOXONE HYDROCHLORIDE 0.4 MG/ML
0.4 INJECTION, SOLUTION INTRAMUSCULAR; INTRAVENOUS; SUBCUTANEOUS AS NEEDED
Status: CANCELLED | OUTPATIENT
Start: 2021-11-18

## 2021-11-18 RX ORDER — KETOROLAC TROMETHAMINE 30 MG/ML
15 INJECTION, SOLUTION INTRAMUSCULAR; INTRAVENOUS
Status: DISCONTINUED | OUTPATIENT
Start: 2021-11-18 | End: 2021-11-18 | Stop reason: HOSPADM

## 2021-11-18 RX ADMIN — TRANEXAMIC ACID 1 G: 1 INJECTION, SOLUTION INTRAVENOUS at 11:33

## 2021-11-18 RX ADMIN — BUPIVACAINE HYDROCHLORIDE IN DEXTROSE 12 MG: 7.5 INJECTION, SOLUTION SUBARACHNOID at 11:30

## 2021-11-18 RX ADMIN — ACETAMINOPHEN 1000 MG: 500 TABLET ORAL at 08:09

## 2021-11-18 RX ADMIN — GABAPENTIN 300 MG: 300 CAPSULE ORAL at 08:09

## 2021-11-18 RX ADMIN — CEFAZOLIN 2 G: 10 INJECTION, POWDER, FOR SOLUTION INTRAVENOUS; PARENTERAL at 11:33

## 2021-11-18 RX ADMIN — PROPOFOL 50 MCG/KG/MIN: 10 INJECTION, EMULSION INTRAVENOUS at 11:37

## 2021-11-18 RX ADMIN — CELECOXIB 400 MG: 200 CAPSULE ORAL at 08:09

## 2021-11-18 RX ADMIN — TRANEXAMIC ACID 1 G: 1 INJECTION, SOLUTION INTRAVENOUS at 12:16

## 2021-11-18 RX ADMIN — Medication 10 MG: at 12:02

## 2021-11-18 RX ADMIN — SODIUM CHLORIDE, POTASSIUM CHLORIDE, SODIUM LACTATE AND CALCIUM CHLORIDE 25 ML/HR: 600; 310; 30; 20 INJECTION, SOLUTION INTRAVENOUS at 08:05

## 2021-11-18 RX ADMIN — DEXAMETHASONE SODIUM PHOSPHATE 5 MG: 4 INJECTION, SOLUTION INTRAMUSCULAR; INTRAVENOUS at 10:02

## 2021-11-18 RX ADMIN — ONDANSETRON HYDROCHLORIDE 4 MG: 2 INJECTION, SOLUTION INTRAMUSCULAR; INTRAVENOUS at 13:00

## 2021-11-18 RX ADMIN — LIDOCAINE HYDROCHLORIDE 60 MG: 20 INJECTION, SOLUTION EPIDURAL; INFILTRATION; INTRACAUDAL; PERINEURAL at 11:37

## 2021-11-18 RX ADMIN — BUPIVACAINE HYDROCHLORIDE 25 ML: 2.5 INJECTION, SOLUTION EPIDURAL; INFILTRATION; INTRACAUDAL; PERINEURAL at 10:02

## 2021-11-18 RX ADMIN — Medication 10 MG: at 12:28

## 2021-11-18 RX ADMIN — MIDAZOLAM 4 MG: 1 INJECTION INTRAMUSCULAR; INTRAVENOUS at 10:02

## 2021-11-18 RX ADMIN — KETOROLAC TROMETHAMINE 30 MG: 30 INJECTION, SOLUTION INTRAMUSCULAR at 13:00

## 2021-11-18 RX ADMIN — SODIUM CHLORIDE, POTASSIUM CHLORIDE, SODIUM LACTATE AND CALCIUM CHLORIDE: 600; 310; 30; 20 INJECTION, SOLUTION INTRAVENOUS at 11:19

## 2021-11-18 RX ADMIN — MIDAZOLAM HYDROCHLORIDE 2 MG: 1 INJECTION, SOLUTION INTRAMUSCULAR; INTRAVENOUS at 11:30

## 2021-11-18 NOTE — ANESTHESIA PREPROCEDURE EVALUATION
Relevant Problems   No relevant active problems       Anesthetic History   No history of anesthetic complications            Review of Systems / Medical History  Patient summary reviewed, nursing notes reviewed and pertinent labs reviewed    Pulmonary  Within defined limits                 Neuro/Psych   Within defined limits           Cardiovascular      Valvular problems/murmurs: mitral insufficiency            Exercise tolerance: >4 METS  Comments: LBBB    Cleared by cardiology. Asymptomatic/nonrheumatic mitral valve prolapse. No evidence of CHF.    GI/Hepatic/Renal  Within defined limits              Endo/Other        Arthritis     Other Findings              Physical Exam    Airway  Mallampati: II  TM Distance: 4 - 6 cm  Neck ROM: normal range of motion   Mouth opening: Normal     Cardiovascular  Regular rate and rhythm,  S1 and S2 normal,  no murmur, click, rub, or gallop  Rhythm: regular  Rate: normal         Dental  No notable dental hx       Pulmonary  Breath sounds clear to auscultation               Abdominal  GI exam deferred       Other Findings            Anesthetic Plan    ASA: 2  Anesthesia type: regional, spinal and general - backup - saphenous block          Induction: Intravenous  Anesthetic plan and risks discussed with: Patient

## 2021-11-18 NOTE — ANESTHESIA PROCEDURE NOTES
Peripheral Block    Start time: 11/18/2021 10:00 AM  End time: 11/18/2021 10:02 AM  Performed by: Kellie Dow CRNA  Authorized by: Kellie Dow CRNA       Pre-procedure: Indications: at surgeon's request and procedure for pain    Preanesthetic Checklist: patient identified, risks and benefits discussed, site marked, timeout performed, anesthesia consent given and patient being monitored    Timeout Time: 10:00 EST          Block Type:   Block Type:   Adductor canal block  Laterality:  Right  Monitoring:  Standard ASA monitoring, continuous pulse ox, frequent vital sign checks, heart rate, oxygen and responsive to questions  Injection Technique:  Single shot  Procedures: ultrasound guided    Patient Position: supine  Prep: chlorhexidine    Location:  Mid thigh  Needle Type:  Ultraplex  Needle Gauge:  20 G  Needle Localization:  Ultrasound guidance  Medication Injected:  Midazolam (VERSED) injection, 4 mg  bupivacaine 0.25%, 25 mL  dexamethasone (DECADRON) 4 mg/mL injection, 5 mg  Med Admin Time: 11/18/2021 10:02 AM    Assessment:  Number of attempts:  1  Injection Assessment:  Incremental injection every 5 mL, local visualized surrounding nerve on ultrasound, negative aspiration for blood, no intravascular symptoms and no paresthesia  Patient tolerance:  Patient tolerated the procedure well with no immediate complications

## 2021-11-18 NOTE — PERIOP NOTES
/66 - hr 72. Pt reports he feels much better - skin is warm  And dry at present. Wife remains at bedside. Denies pain. Will continue to monitor.

## 2021-11-18 NOTE — ANESTHESIA POSTPROCEDURE EVALUATION
Procedure(s):  RIGHT TKA. regional, spinal    Anesthesia Post Evaluation      Multimodal analgesia: multimodal analgesia used between 6 hours prior to anesthesia start to PACU discharge  Patient location during evaluation: PACU  Patient participation: complete - patient participated  Level of consciousness: awake and alert  Pain management: adequate  Airway patency: patent  Anesthetic complications: no  Cardiovascular status: acceptable  Respiratory status: acceptable  Hydration status: acceptable  Comments: Ok to discharge when standard criteria are met.    Post anesthesia nausea and vomiting:  none  Final Post Anesthesia Temperature Assessment:  Normothermia (36.0-37.5 degrees C)      INITIAL Post-op Vital signs:   Vitals Value Taken Time   /65 11/18/21 1309   Temp 36.2 °C (97.1 °F) 11/18/21 1309   Pulse 57 11/18/21 1309   Resp 13 11/18/21 1309   SpO2 97 % 11/18/21 1309

## 2021-11-18 NOTE — DISCHARGE INSTRUCTIONS
TOTAL KNEE REPLACEMENT DISCHARGE INFORMATION    You have undergone a Total Knee Replacement. The following list is to provide you with some expectations over the next week upon your discharge from the hospital.     1. Please begin Aspirin 325mg every 12 hours (twice daily) starting tomorrow as directed until Dr. Jyoti Barnett instructs you to discontinue it. If you are not sure which blood thinner to take please contact Dr. Kevin Bonilla office next business day for clarification. 2. Please be sure to continue your thigh-high compression stockings on both sides until instructed to discontinue them. 3. Over the course of the next week, you should continue thigh high stockings on the operative leg, DO NOT GET THE INCISION WET until instructed to do so. Please make sure the stockings on the operative leg are pulled up all the way to the thigh to prevent any creases which may result in abrasions or creases in the skin. If the stockings are creating creases resulting in abrasions or blistering on the operative leg please remove the stockings. You may take the stockings off on the nonoperative leg once you arrive home. 4. You may notice some bruising on your thigh and it may extend all the way to the ankles. That is perfectly normal early on.  5. You may experience a clicking noise in your knee and that is normal because of the artificial knee. 6. It is important to remember if you have any surgical procedure including dental procedures which may result in bleeding that an antibiotic 1 hour before the procedure will be required. Please let the provider performing the procedure know that you have artificial joint. If an antibiotic is not given by them please call our office and give us at least 5 business days to get you the appropriate antibiotics if needed. This rule applies indefinitely. 7. If an Ace wrap is placed on your knee you may remove the Ace wrap only 48 hours after your surgery.   We will leave the stockings on.  8. During the course of your  over the next week, should you experience fevers of 101.5 F, a white drainage from the incision, extreme redness around the incision, or the incision begins to have a pungent smell; Please call our office or page Dr. Montserrat Wisdom whose numbers are provided in your discharge paperwork. To Page Dr. Montserrat Wisdom please call [phone] and dial [phone]. Have the  page whomever is on call for Orthopedics. These are signs of infection and it should be addressed immediately. 9. Please do not drive until instructed to do so. 10. If you need a refill on pain medication please allow at least 2 business days notice for any refills. Immediate refill request may not be possible. Medication refill requests will not be addressed during non-business hours. Please do not page the on-call provider for pain medication refills after hours. 11. It is very important for you to begin your Outpatient Physical Therapy within a couple days of the day of your discharge and your appointment should have been set up. If your physical therapy has not been set up please call our office the next business day for assistance. Details provided in a separate sheet. 12. Remove ace wrap in 48 hrs after surgery but keep stockings on. 15. Finish all antibiotics, start the antibiotics as soon as you go home if you have prescribed antibiotics. 14.  You should perform your daily home exercises at least 4 times a day 30 minutes each time. Perform foot pumps on both feet at least 10 times every 15 minutes while awake. This helps prevent swelling in the leg and can help prevent blood clots in the leg. On the operative leg if you have significant swelling you can also lay down flat and put 3 pillows under the heel so the heel is above the heart level and then perform foot pumps 4 times a day for 10 minutes to help bring the swelling down. 15.  Do not place anything under your knee while sleeping at night.   Elevate your heel so your  is straight while sleeping at night. 16.  Perform deep breathing exercises 10 times every hour while awake. 17.  If you had a nerve block and you are not having pain the day of the surgery, at nighttime it is okay to take 1 pain medication before going to sleep to help prevent excruciating pain when the nerve block wears off. 18.  You may be given an ice pack machine use that to help prevent swelling. Do not apply heat to the incision area. 19.  While you are awake at least 10 times every 30 minutes move your foot up and down as if you are pumping gas from both feet to help prevent swelling and to promote blood circulation in the calf. 20.  If you develop sudden onset of shortness of breath or severe calf pain please go to closest emergency room. 21. Your pain medicine is a Narcotic and may cause constipation. You may take an over the counter stool softener while taking pain medicine. ICE THERAPY WRAP:    · Keep ice therapy wrap on when resting. · DO not wear when moving or walking. · Ice packs are reusable. · Ice Therapy wrap holds two ice packs at a time. Things to watch for:             Increased swelling of the surgical site             Spreading of redness around the incision site             Drainage of pus from the incision site             Developing a fever of 101.5 °F or higher             If any of these symptoms occur you have any questions please contact our office at [phone]. If you need to talk to Dr. Rigoberto Murguia on an urgent basis please call the hospital at [phone]. 0 for the . Please let the  know you are a surgical patient of Dr. Rigoberto Murguia and you wish to get in contact with him. If Dr. Rigoberto Murguia or his staff do not call you back within 30 minutes. Please tell the  to try again. Phone: [phone]  [email or web address]

## 2021-11-18 NOTE — ANESTHESIA PROCEDURE NOTES
Spinal Block    Start time: 11/18/2021 11:25 AM  End time: 11/18/2021 11:30 AM  Performed by: Robby Felix CRNA  Authorized by: Robby Felix CRNA     Pre-procedure:   Indications: primary anesthetic  Preanesthetic Checklist: patient identified, risks and benefits discussed, anesthesia consent, site marked, patient being monitored and timeout performed    Timeout Time: 11:25 EST          Spinal Block:   Patient Position:  Seated  Prep Region:  Lumbar      Location:  L3-4  Technique:  Single shot        Needle:   Needle Type:  Pencan  Needle Gauge:  22 G  Attempts:  2      Events: CSF confirmed and paresthesia        Assessment:  Insertion:  Uncomplicated  Patient tolerance:  Patient tolerated the procedure well with no immediate complications  Paresthesia at L4-L5; attempted again at L3-L4

## 2021-11-18 NOTE — PROGRESS NOTES
Problem: Mobility Impaired (Adult and Pediatric)  Goal: *Acute Goals and Plan of Care (Insert Text)  Description: Pt is MOD (I)  with gait and navigates stairs with MOD (I) . PLOF: Community ambulator who did not use an AD and who was (I) with ADLs. Outcome: Resolved/Met     Problem: Patient Education: Go to Patient Education Activity  Goal: Patient/Family Education  Outcome: Resolved/Met   PHYSICAL THERAPY EVALUATION AND DISCHARGE    Patient: Rosmery Lucero (31 y.o. male)  Date: 11/18/2021  Primary Diagnosis: Osteoarthritis of right knee, unspecified osteoarthritis type [M17.11]  Knee osteoarthritis [M17.10]  Procedure(s) (LRB):  RIGHT TKA (Right) Day of Surgery   Precautions:  WBAT    ASSESSMENT :  Based on the objective data described below, the patient presents s/p R TKA and he is WBAT. HE is able to ambulate with a RW with MOD (I) and he is able to navigate stairs with MOD (I). He is educated on a HEP and tolerates well. He can return home with outpatient P.T. Patient does not require further skilled intervention at this level of care. PLAN :  Recommendations and Planned Interventions:   No formal PT needs identified at this time. Discharge Recommendations: Outpatient  Further Equipment Recommendations for Discharge: N/A     SUBJECTIVE:   Patient states let's do this!     OBJECTIVE DATA SUMMARY:   History reviewed. No pertinent past medical history.   Past Surgical History:   Procedure Laterality Date    HX KNEE ARTHROSCOPY       Barriers to Learning/Limitations: None  Compensate with: N/A  Home Situation:   Home Situation  Home Environment: Private residence  # Steps to Enter: 5  Rails to Enter: Yes  Hand Rails : Bilateral  One/Two Story Residence: One story  Living Alone: No  Support Systems: Spouse/Significant Other  Patient Expects to be Discharged to[de-identified] House  Current DME Used/Available at Home: Walker, rolling  Critical Behavior:  Neurologic State: Alert  Orientation Level: Oriented X4  Skin Integrity: Incision (comment) (right knee)  Skin Integumentary  Skin Integrity: Incision (comment) (right knee)  Strength:    Strength: Generally decreased, functional (R knee 4/5, SLR 1430, 3 hours after spinal)  Tone & Sensation:   Sensation: Impaired (Feet tingling)  Range Of Motion:   AROM: Generally decreased, functional (R knee 9-93)  PROM: Generally decreased, functional (R knee 8-100)  Posture:  Posture (WDL): Within defined limits  Functional Mobility:  Bed Mobility:  Rolling: Modified independent  Supine to Sit: Modified independent  Sit to Supine: Modified independent  Scooting: Modified independent  Transfers:  Sit to Stand: Modified independent  Stand to Sit: Modified independent  Balance:   Sitting: Intact  Standing: Intact  Ambulation/Gait Training:  Distance (ft): 1100 Feet (ft)  Assistive Device: Walker, rolling; Other (comment) (no AD for about 15')  Ambulation - Level of Assistance: Modified independent  Gait Description (WDL): Exceptions to WDL  Gait Abnormalities: Path deviations  Right Side Weight Bearing: As tolerated  Stairs:  Number of Stairs Trained: 5 (reciprocating)  Stairs - Level of Assistance: Modified independent  Rail Use: Both     Today's TX:   Pt is able to ambulate with MOD (I)  with a RW. He is able to navigate stairs with MOD (I). He is educated on a HEP and states understanding. Pain:  Pain level pre-treatment: 0/10   Pain level post-treatment: 1/10  Pain Location: R knee  Pain Intervention(s): Medication (see MAR); Rest, Ice, Repositioning   Response to intervention: Nurse notified, See doc flow    Activity Tolerance:   Excellent  Please refer to the flowsheet for vital signs taken during this treatment.   After treatment:   []         Patient left in no apparent distress sitting up in chair  [x]         Patient left in no apparent distress in bed  []         Call bell left within reach  [x]         Nursing notified  []         Caregiver present  []         Bed alarm activated  []         SCDs applied    COMMUNICATION/EDUCATION:   [x]         Role of Physical Therapy in the acute care setting. [x]         Fall prevention education was provided and the patient/caregiver indicated understanding. [x]         Patient/family have participated as able in goal setting and plan of care. [x]         Patient/family agree to work toward stated goals and plan of care. []         Patient understands intent and goals of therapy, but is neutral about his/her participation. []         Patient is unable to participate in goal setting/plan of care: ongoing with therapy staff.  []         Other:     Thank you for this referral.  Keri Gregg, PT, DPT   Time Calculation: 27 mins

## 2021-11-18 NOTE — INTERVAL H&P NOTE
Update History & Physical    The Patient's History and Physical was reviewed with the patient. There was no change. The surgical site was confirmed by the patient and me. Patient understands and wants to proceed with the procedure. If applicable, I have discussed with the patient / power of  the rationale for blood component transfusion; its benefits in treating or preventing fatigue, organ damage, or death; and its risk which includes mild transfusion reactions, rare risk of blood borne infection, or more serious but rare reactions. I have discussed the alternatives to transfusion, including the risk and consequences of not receiving transfusion. The patient / Fitzpatrick Nadir of  had an opportunity to ask questions and had agreed to proceed with transfusion of blood components. Plan:  The risk, benefits, expected outcome, and alternative to the recommended procedure have been discussed with the patient.       Electronically signed by KALA Hummel on 11/18/2021 at 7:55 AM

## 2021-11-18 NOTE — PERIOP NOTES
Pt and wife given discharge and ice wrap therapy instructions with verbalized understanding. No questions voiced. Pt left unit ambulatory. Pt discharged home with wife.

## 2021-11-18 NOTE — OP NOTES
Operative Note    Patient: Anaid Manuel MRN: [id number]  Surgery Date: 11/18/2021  [unfilled]          Procedure  Primary Surgeon    RIGHT TKA  Gerald Domínguez MD    * Panel 2 does not exist *  * Panel 2 does not exist *    * Panel 3 does not exist *  * Panel 3 does not exist *     Surgeon(s) and Role:     * Gerald Domínguez MD - Primary    Other OR Staff/Assistants:  Circ-1: Vivek Hui  Scrub Tech-1: Lina Davenport  Retractor Ramirez: Glendy Kirkpatrick  Surg Asst-1: Marc Sims    1st Assistant Tasks:  Closing    Pre-operative Diagnosis:  Osteoarthritis of right knee, unspecified osteoarthritis type [M17.11]    Post-operative Diagnosis: same as preop diagnosis    Anesthesia Type: Spinal     Findings: djd    Complications: No    EBL: 50 cc    Specimens: None    Implants     Implant    Cement Bne 40gm Full Dose Pmma W/ Gent Hi Visc Radpq Lng - [id number] - Implanted   (Right) Knee    Inventory item: CEMENT BNE 40GM FULL DOSE PMMA W/ GENT HI VISC RADPQ LNG Model/Cat number: [id number]    : [employer] Lot number: [id number]    Size: 40g      As of 11/18/2021     Status: Implanted                  Component Fem Sz 6 R Knee Ankit Post Stbl Aquilino Attune - [id number] - Implanted   (Right) Knee    Inventory item: COMPONENT FEM SZ 6 R KNEE ANKIT POST STBL AQUILINO ATTUNE Model/Cat number: [id number]    : [employer]_E[employer] Imp Lot number: [id number]    Size: 6n right      As of 11/18/2021     Status: Implanted                  Insert Tib Sz 6 Thk5mm Knee Post Stbl Rot Platfrm Attune - [id number] - Implanted   (Right) Knee    Inventory item: INSERT TIB SZ 6 THK5MM KNEE POST STBL ROT PLATFRM ATTUNE Model/Cat number: [id number]    : [employer]_E[employer] Imp Lot number: [id number]    Size: 6 5mm      As of 11/18/2021     Status: Implanted                  Component Pat Vdi76xt Polyeth Dome Aquilino Medialized Attune - [id number] - Implanted   (Right) Knee Inventory item: COMPONENT PAT CIS87IB POLYETH DOME RIVERA MEDIALIZED ATTUNE Model/Cat number: [id number]    : [employer]WD Lot number: [id number]    Size: 38mm      As of 11/18/2021     Status: Implanted                  Component Tib Sz 6 Rivera Base Rot Platfrm Knee Sys Attune - [id number] - Implanted   (Right) Knee    Inventory item: COMPONENT TIB SZ 6 RIVERA BASE ROT PLATFRM KNEE SYS ATTUNE Model/Cat number: [id number]    : [employer] Lot number: [id number]    Size: 6      As of 11/18/2021     Status: Implanted                         OPERATIVE PROCEDURE:  Please note the first assistant role was to help in patient positioning and draping of the extremity in a sterile fashion. Also during the surgery the assistant's responsibilities included but not limited to extremity positioning during critical portions of the surgery. Assisting in using and placement of retractors during surgery. Lower extremity was prepped and draped in a sterile fashion. After adequate anesthesia was given, the patient was placed in a well-padded supine position. Subvastus arthrotomy from the tibial tubercle to the superior pole of the patella was made. Knee was hyperflexed. Intramedullary reaming of distal femur and proximal tibia was performed. 10 mm of distal femur was cut. Anterior-posterior sizing guide was used. Anterior, posterior, chamfer cuts, and box cuts were made next. Proximal tibial cut and preparation performed. Posterior osteophyte meniscal remnants were removed, and also patella was everted. Free-hand cut of the patella was made. Trial components were placed. The patient was found to have excellent range of motion and stability with all trial components. All the trial components removed. Copious irrigation performed. Distal femur, proximal tibia, and patella were impacted in place. Excessive cement was removed.   After the cement was hard, Subvastus arthrotomy closed with Vicryl and Prineo stitch. Compressive dressing was applied. The patient was taken to PACU in stable condition.       Marilyn Warren MD

## 2021-11-18 NOTE — PERIOP NOTES
Pt was with Joe doing PT - working with the stairs - pt got lightheaded and diaphoretic - skin cool and clammy. Pt placed in wheelchair and brought back to OPS - placed in bed - laid flat on stretcher - BP 84/58 - hr 72. Cool washcloth applied to forehead. LR hung and infusing open to gravity. Wife at bedside. Will monitor.

## 2021-11-22 ENCOUNTER — OFFICE VISIT (OUTPATIENT)
Dept: ORTHOPEDIC SURGERY | Age: 66
End: 2021-11-22
Payer: MEDICARE

## 2021-11-22 DIAGNOSIS — Z96.651 STATUS POST RIGHT KNEE REPLACEMENT: Primary | ICD-10-CM

## 2021-11-22 PROCEDURE — 99024 POSTOP FOLLOW-UP VISIT: CPT | Performed by: NURSE PRACTITIONER

## 2021-12-02 ENCOUNTER — VIRTUAL VISIT (OUTPATIENT)
Dept: ORTHOPEDIC SURGERY | Age: 66
End: 2021-12-02
Payer: MEDICARE

## 2021-12-02 DIAGNOSIS — Z96.651 STATUS POST RIGHT KNEE REPLACEMENT: Primary | ICD-10-CM

## 2021-12-02 PROCEDURE — 99024 POSTOP FOLLOW-UP VISIT: CPT | Performed by: NURSE PRACTITIONER

## 2021-12-02 NOTE — PROGRESS NOTES
Subjective:      Patient presents for postop care following right TKA. Surgery was on 11/17/2021. Ambulating independantly. Pain is controlled without any medications. .    Objective: There were no vitals taken for this visit. General:  alert, cooperative, no distress, appears stated age   ROM: -5/105   Incision:   healing well, no drainage, no erythema, incision well approximated, mild swelling     Assessment:     Doing well postoperatively. Plan:     1. Continue PT. 2. Wound care/showering discussed. 3. Continue DVT prophylaxis as directed. 4. Follow up at 1 yr with Dr. Montserrat Wisdom for xray's of the right knee and as needed. Saul Sotelo, who was evaluated through a synchronous (real-time) audio-video encounter, and/or his healthcare decision maker, is aware that it is a billable service, with coverage as determined by his insurance carrier. He provided verbal consent to proceed: Yes, and patient identification was verified. It was conducted pursuant to the emergency declaration under the [address] [place] [address] authority and the [employer] Act. A caregiver was present when appropriate. Ability to conduct physical exam was limited. I was in the office. The patient was at home.

## 2022-01-03 DIAGNOSIS — M17.11 OSTEOARTHRITIS OF RIGHT KNEE, UNSPECIFIED OSTEOARTHRITIS TYPE: ICD-10-CM

## 2022-01-03 DIAGNOSIS — M25.561 RIGHT KNEE PAIN, UNSPECIFIED CHRONICITY: Primary | ICD-10-CM

## 2022-01-26 LAB
CHOLEST SERPL-MCNC: 210 MG/DL (ref 110–200)
HDLC SERPL-MCNC: 4.7 MG/DL (ref 0–5)
HDLC SERPL-MCNC: 45 MG/DL
LDL/HDL RATIO,LDHD: 3.4
LDLC SERPL CALC-MCNC: 154 MG/DL (ref 50–99)
NON-HDL CHOLESTEROL, 011976: 165 MG/DL
PSA SERPL-MCNC: 0.55 NG/ML
TRIGL SERPL-MCNC: 52 MG/DL (ref 40–149)
VLDLC SERPL CALC-MCNC: 10 MG/DL (ref 8–30)

## 2022-01-27 ENCOUNTER — TELEPHONE (OUTPATIENT)
Dept: INTERNAL MEDICINE CLINIC | Age: 67
End: 2022-01-27

## 2022-01-27 LAB
A-G RATIO,AGRAT: 1.9 RATIO (ref 1.1–2.6)
ALBUMIN SERPL-MCNC: 4.5 G/DL (ref 3.5–5)
ALP SERPL-CCNC: 85 U/L (ref 40–125)
ALT SERPL-CCNC: 20 U/L (ref 5–40)
AST SERPL W P-5'-P-CCNC: 16 U/L (ref 10–37)
BILIRUB SERPL-MCNC: 0.4 MG/DL (ref 0.2–1.2)
BILIRUBIN, DIRECT,CBIL: <0.2 MG/DL (ref 0–0.3)
GLOBULIN,GLOB: 2.4 G/DL (ref 2–4)
PROT SERPL-MCNC: 6.9 G/DL (ref 6.2–8.1)

## 2022-01-27 NOTE — TELEPHONE ENCOUNTER
Spoke with patient he is aware Hepatic Function was added to the lab work that was done 1/26/2022. Per Dr. Dawit carrasco to fax orders to add on labs. Labs faxed to [facility].

## 2022-01-27 NOTE — TELEPHONE ENCOUNTER
----- Message from Woodrow Whittaker LPN sent at 8/97/2943  9:34 AM EST -----  Regarding: FW: Upcoming Annual Physical     ----- Message -----  From: Maurice Sears  Sent: 1/27/2022   5:43 AM EST  To: [facility]  Subject: Upcoming Annual Physical                         I have an annual physical with you on 3 Feb 2022. On my last visit with you in Nov 2021 for a pre-operation exam (knee replacement), you provided lab orders  in advance of my upcoming examination. I completed these tests yesterday (PSA Screening and LIPID Panel). In reviewing the results this morning, I noticed that a complete Metabolic Panel had not been ordered. It is an error? I still have time to complete this any any other tests before our appointment. If so, please sent lab order by email or text message and I will complete the test ASAP.

## 2022-01-27 NOTE — TELEPHONE ENCOUNTER
I added a hepatic function panel to his upcoming lab orders. He does not need a BMP (because BMP plus liver function test would be a CMP), since he has had several BMPs checked last year. Please either mail him the order, or have it sent to the [facility] facility where he is having blood work done, thanks.   Let him know

## 2022-02-03 ENCOUNTER — OFFICE VISIT (OUTPATIENT)
Dept: INTERNAL MEDICINE CLINIC | Age: 67
End: 2022-02-03
Payer: MEDICARE

## 2022-02-03 VITALS
SYSTOLIC BLOOD PRESSURE: 131 MMHG | TEMPERATURE: 97.9 F | OXYGEN SATURATION: 96 % | HEART RATE: 93 BPM | HEIGHT: 65 IN | RESPIRATION RATE: 16 BRPM | WEIGHT: 145 LBS | DIASTOLIC BLOOD PRESSURE: 75 MMHG | BODY MASS INDEX: 24.16 KG/M2

## 2022-02-03 DIAGNOSIS — Z12.5 SCREENING FOR PROSTATE CANCER: ICD-10-CM

## 2022-02-03 DIAGNOSIS — E78.00 PURE HYPERCHOLESTEROLEMIA: ICD-10-CM

## 2022-02-03 DIAGNOSIS — N52.8 OTHER MALE ERECTILE DYSFUNCTION: ICD-10-CM

## 2022-02-03 DIAGNOSIS — Z00.00 MEDICARE ANNUAL WELLNESS VISIT, SUBSEQUENT: Primary | ICD-10-CM

## 2022-02-03 DIAGNOSIS — Z71.89 ADVANCED DIRECTIVES, COUNSELING/DISCUSSION: ICD-10-CM

## 2022-02-03 PROCEDURE — G8427 DOCREV CUR MEDS BY ELIG CLIN: HCPCS | Performed by: INTERNAL MEDICINE

## 2022-02-03 PROCEDURE — G0463 HOSPITAL OUTPT CLINIC VISIT: HCPCS | Performed by: INTERNAL MEDICINE

## 2022-02-03 PROCEDURE — G8510 SCR DEP NEG, NO PLAN REQD: HCPCS | Performed by: INTERNAL MEDICINE

## 2022-02-03 PROCEDURE — G0439 PPPS, SUBSEQ VISIT: HCPCS | Performed by: INTERNAL MEDICINE

## 2022-02-03 PROCEDURE — G8420 CALC BMI NORM PARAMETERS: HCPCS | Performed by: INTERNAL MEDICINE

## 2022-02-03 PROCEDURE — G8536 NO DOC ELDER MAL SCRN: HCPCS | Performed by: INTERNAL MEDICINE

## 2022-02-03 PROCEDURE — 99497 ADVNCD CARE PLAN 30 MIN: CPT | Performed by: INTERNAL MEDICINE

## 2022-02-03 PROCEDURE — 1101F PT FALLS ASSESS-DOCD LE1/YR: CPT | Performed by: INTERNAL MEDICINE

## 2022-02-03 PROCEDURE — 99213 OFFICE O/P EST LOW 20 MIN: CPT | Performed by: INTERNAL MEDICINE

## 2022-02-03 PROCEDURE — 3017F COLORECTAL CA SCREEN DOC REV: CPT | Performed by: INTERNAL MEDICINE

## 2022-02-03 RX ORDER — SILDENAFIL CITRATE 20 MG/1
20 TABLET ORAL 3 TIMES DAILY
Qty: 270 TABLET | Refills: 3 | Status: SHIPPED | OUTPATIENT
Start: 2022-02-03

## 2022-02-03 RX ORDER — SILDENAFIL CITRATE 20 MG/1
20 TABLET ORAL 3 TIMES DAILY
Qty: 10 TABLET | Refills: 3 | Status: SHIPPED | OUTPATIENT
Start: 2022-02-03 | End: 2022-02-03 | Stop reason: CLARIF

## 2022-02-03 NOTE — ACP (ADVANCE CARE PLANNING)
Advance Care Planning     Advance Care Planning (ACP) Physician/NP/PA Conversation      Date of Conversation: 2/3/2022  Conducted with: Patient with Decision Making Capacity    Healthcare Decision Maker:   No healthcare decision makers have been documented. Click here to complete [address] including selection of the Healthcare Decision Maker Relationship (ie \"Primary\")      Today we documented Decision Maker(s) consistent with Legal Next of Kin hierarchy. Care Preferences:    Hospitalization: \"If your health worsens and it becomes clear that your chance of recovery is unlikely, what would be your preference regarding hospitalization? \"  The patient would prefer comfort-focused treatment without hospitalization. Ventilation: \"If you were unable to breathe on your own and your chance of recovery was unlikely, what would be your preference about the use of a ventilator (breathing machine) if it was available to you? \"   The patient would NOT desire the use of a ventilator. Resuscitation: \"In the event your heart stopped as a result of an underlying serious health condition, would you want attempts to be made to restart your heart, or would you prefer a natural death? \"   Yes, attempt to resuscitate.     Additional topics discussed: artificial nutrition, ventilation preferences, hospitalization preferences and resuscitation preferences    Conversation Outcomes / Follow-Up Plan:   ACP in process - information provided, considering goals and options  Reviewed DNR/DNI and patient elects Full Code (Attempt Resuscitation)     Length of Voluntary ACP Conversation in minutes:  16 minutes    Ember Rojas MD

## 2022-02-03 NOTE — PROGRESS NOTES
HPI     Yohannes Bassett is here for his annual Medicare wellness/ACP visit. He is status post right knee replacement, and is gradually increasing his activity. He is going to play golf today. He still not as active as he used to be, and feels that he can improve his cholesterol profile once he is up and about more. He has not had no alcohol in over 3 months, and feels his weight loss is due to this. No past medical history on file. Past Surgical History:   Procedure Laterality Date    HX KNEE ARTHROSCOPY          Current Outpatient Medications   Medication Sig Dispense Refill    sildenafiL (REVATIO) 20 mg tablet Take 1 Tablet by mouth three (3) times daily. 270 Tablet 3        No Known Allergies     Social History     Socioeconomic History    Marital status:      Spouse name: Not on file    Number of children: Not on file    Years of education: Not on file    Highest education level: Not on file   Occupational History    Not on file   Tobacco Use    Smoking status: Never Smoker    Smokeless tobacco: Never Used   Substance and Sexual Activity    Alcohol use: Yes    Drug use: Not on file     Comment: 12 beers weekly    Sexual activity: Not on file   Other Topics Concern    Not on file   Social History Narrative    Not on file     Social Determinants of Health     Financial Resource Strain:     Difficulty of Paying Living Expenses: Not on file   Food Insecurity:     Worried About Running Out of Food in the Last Year: Not on file    Ho of Food in the Last Year: Not on file   Transportation Needs:     Lack of Transportation (Medical): Not on file    Lack of Transportation (Non-Medical):  Not on file   Physical Activity:     Days of Exercise per Week: Not on file    Minutes of Exercise per Session: Not on file   Stress:     Feeling of Stress : Not on file   Social Connections:     Frequency of Communication with Friends and Family: Not on file    Frequency of Social Gatherings with Friends and Family: Not on file    Attends Buddhism Services: Not on file    Active Member of Clubs or Organizations: Not on file    Attends Club or Organization Meetings: Not on file    Marital Status: Not on file   Intimate Partner Violence:     Fear of Current or Ex-Partner: Not on file    Emotionally Abused: Not on file    Physically Abused: Not on file    Sexually Abused: Not on file   Housing Stability:     Unable to Pay for Housing in the Last Year: Not on file    Number of [place] in the Last Year: Not on file    Unstable Housing in the Last Year: Not on file        Family History   Problem Relation Age of Onset    Diabetes Mother     Alzheimer's Disease Mother     Stroke Mother     Diabetes Father         type 2,  age 68           Visit Vitals  /75   Pulse 93   Temp 97.9 °F (36.6 °C) (Temporal)   Resp 16   Ht 5' 5\" (1.651 m)   Wt 145 lb (65.8 kg)   SpO2 96%   BMI 24.13 kg/m²        Review of Systems   Respiratory: Negative for shortness of breath. Cardiovascular: Negative for chest pain. Gastrointestinal: Negative for blood in stool. Denies loss of appetite   Genitourinary: Negative for hematuria. Psychiatric/Behavioral: Negative for depression. The patient is not nervous/anxious. Physical Exam  Constitutional:       General: He is not in acute distress. Appearance: He is normal weight. HENT:      Right Ear: Tympanic membrane, ear canal and external ear normal.      Left Ear: Tympanic membrane and ear canal normal.   Eyes:      General: No scleral icterus. Conjunctiva/sclera: Conjunctivae normal.   Neck:      Comments: Bilateral carotid upstrokes normal to palpation  Cardiovascular:      Rate and Rhythm: Normal rate and regular rhythm. Pulses: Normal pulses. Heart sounds: No friction rub. No gallop. Pulmonary:      Effort: Pulmonary effort is normal.      Breath sounds: Normal breath sounds.    Abdominal:      General: Abdomen is flat. Palpations: Abdomen is soft. There is no mass. Tenderness: There is no abdominal tenderness. Genitourinary:     Prostate: Normal.      Rectum: Normal. Guaiac result negative. Musculoskeletal:      Cervical back: Neck supple. Comments: No clubbing, cyanosis, or edema. Skin:     General: Skin is warm and dry. Neurological:      Mental Status: He is alert and oriented to person, place, and time. Psychiatric:         Mood and Affect: Mood normal.                  Diagnoses and all orders for this visit:    1. Medicare annual wellness visit, subsequent    2. Advanced directives, counseling/discussion  -     ADVANCE CARE PLANNING FIRST 30 MINS    3. Other male erectile dysfunction  -     METABOLIC PANEL, COMPREHENSIVE; Future  -     URINALYSIS W/MICROSCOPIC; Future  -     sildenafiL (REVATIO) 20 mg tablet; Take 1 Tablet by mouth three (3) times daily. 4. Screening for prostate cancer  Comments:  Normal PSA, normal prostate on exam.  Orders:  -     PSA SCREENING (SCREENING); Future    5. Pure hypercholesterolemia  Comments:  Plans to increase his exercise as tolerated after knee replacement, to increase his HDL. Check yearly  Orders:  -     LIPID PANEL; Future         Follow-up and Dispositions    · Return in about 1 year (around 2/3/2023) for physical-- labs 1 or 2 weeks before. Carl Miranda MD   This is the Subsequent Medicare Annual Wellness Exam, performed 12 months or more after the Initial AWV or the last Subsequent AWV    I have reviewed the patient's medical history in detail and updated the computerized patient record. Assessment/Plan   Education and counseling provided:  Are appropriate based on today's review and evaluation    1. Advanced directives, counseling/discussion  2.  Medicare annual wellness visit, subsequent       Depression Risk Factor Screening     3 most recent PHQ Screens 6/11/2021   Little interest or pleasure in doing things Not at all   Feeling down, depressed, irritable, or hopeless Not at all   Total Score PHQ 2 0       Alcohol & Drug Abuse Risk Screen    Do you average more than 1 drink per night or more than 7 drinks a week: No    In the past three months have you have had more than 4 drinks containing alcohol on one occasion: No          Functional Ability and Level of Safety    Hearing: Hearing is good. Activities of Daily Living: The home contains: no safety equipment. Patient does total self care      Ambulation: with no difficulty     Fall Risk:  Fall Risk Assessment, last 12 mths 6/11/2021   Able to walk? Yes   Fall in past 12 months? 0   Do you feel unsteady? 0   Are you worried about falling 0      Abuse Screen:  Patient is not abused       Cognitive Screening    Has your family/caregiver stated any concerns about your memory: no     Cognitive Screening: Normal - Mini Cog Test    Health Maintenance Due     Health Maintenance Due   Topic Date Due    Colorectal Cancer Screening Combo  Never done    Shingrix Vaccine Age 50> (1 of 2) Never done    Pneumococcal 65+ years (1 of 1 - PPSV23) Never done    Medicare Yearly Exam  Never done    Flu Vaccine (1) Never done       Patient Care Team   Patient Care Team:  Jalen Cabral MD as PCP - General (Internal Medicine)  Jalen Cabral MD as PCP [facility]  Deion Chun MD (Orthopedic Surgery)    History     Patient Active Problem List   Diagnosis Code    Knee osteoarthritis M17.10     No past medical history on file. Past Surgical History:   Procedure Laterality Date    HX KNEE ARTHROSCOPY       Current Outpatient Medications   Medication Sig Dispense Refill    amoxicillin (AMOXIL) 500 mg capsule Take 500 mg by mouth three (3) times daily.  ondansetron hcl (ZOFRAN) 4 mg tablet Take 1 Tablet by mouth every eight (8) hours as needed for Nausea or Vomiting.  10 Tablet 0    sildenafil citrate (VIAGRA) 25 mg tablet 1 or 2 p.o. 1 hour before attempted intercourse, limit 2 pills for 24 hours. (Patient not taking: Reported on 2021) 30 Tablet 5    aspirin delayed-release 81 mg tablet Take 81 mg by mouth daily. No Known Allergies    Family History   Problem Relation Age of Onset    Diabetes Mother     Alzheimer's Disease Mother     Stroke Mother     Diabetes Father         type 2,  age 68     Social History     Tobacco Use    Smoking status: Never Smoker    Smokeless tobacco: Never Used   Substance Use Topics    Alcohol use:  Yes         Daphnie Walls MD

## 2022-02-03 NOTE — PATIENT INSTRUCTIONS
Medicare Wellness Visit, Male    The best way to live healthy is to have a lifestyle where you eat a well-balanced diet, exercise regularly, limit alcohol use, and quit all forms of tobacco/nicotine, if applicable. Regular preventive services are another way to keep healthy. Preventive services (vaccines, screening tests, monitoring & exams) can help personalize your care plan, which helps you manage your own care. Screening tests can find health problems at the earliest stages, when they are easiest to treat. [facility] follows the current, evidence-based guidelines published by the [facility] ([facility]) when recommending preventive services for our patients. Because we follow these guidelines, sometimes recommendations change over time as research supports it. (For example, a prostate screening blood test is no longer routinely recommended for men with no symptoms). Of course, you and your doctor may decide to screen more often for some diseases, based on your risk and co-morbidities (chronic disease you are already diagnosed with). Preventive services for you include:  - Medicare offers their members a free annual wellness visit, which is time for you and your primary care provider to discuss and plan for your preventive service needs. Take advantage of this benefit every year!  -All adults over age 72 should receive the recommended pneumonia vaccines. Current USPSTF guidelines recommend a series of two vaccines for the best pneumonia protection.   -All adults should have a flu vaccine yearly and tetanus vaccine every 10 years.  -All adults age 48 and older should receive the shingles vaccines (series of two vaccines).        -All adults age 38-68 who are overweight should have a diabetes screening test once every three years.   -Other screening tests & preventive services for persons with diabetes include: an eye exam to screen for diabetic retinopathy, a kidney function test, a foot exam, and stricter control over your cholesterol.   -Cardiovascular screening for adults with routine risk involves an electrocardiogram (ECG) at intervals determined by the provider.   -Colorectal cancer screening should be done for adults age 54-65 with no increased risk factors for colorectal cancer. There are a number of acceptable methods of screening for this type of cancer. Each test has its own benefits and drawbacks. Discuss with your provider what is most appropriate for you during your annual wellness visit. The different tests include: colonoscopy (considered the best screening method), a fecal occult blood test, a fecal DNA test, and sigmoidoscopy.  -All adults born between [facility] should be screened once for Hepatitis C.  -An Abdominal Aortic Aneurysm (AAA) Screening is recommended for men age 73-68 who has ever smoked in their lifetime.      Here is a list of your current Health Maintenance items (your personalized list of preventive services) with a due date:  Health Maintenance Due   Topic Date Due    Colorectal Screening  Never done    Shingles Vaccine (1 of 2) Never done    Pneumococcal Vaccine (1 of 1 - PPSV23) Never done    Annual Well Visit  Never done    Yearly Flu Vaccine (1) Never done

## 2022-03-19 PROBLEM — M17.9 KNEE OSTEOARTHRITIS: Status: ACTIVE | Noted: 2021-11-18

## 2025-05-12 DIAGNOSIS — M25.562 PAIN IN BOTH KNEES, UNSPECIFIED CHRONICITY: Primary | ICD-10-CM

## 2025-05-12 DIAGNOSIS — M25.561 PAIN IN BOTH KNEES, UNSPECIFIED CHRONICITY: Primary | ICD-10-CM

## 2025-05-19 DIAGNOSIS — M25.562 PAIN IN BOTH KNEES, UNSPECIFIED CHRONICITY: ICD-10-CM

## 2025-05-19 DIAGNOSIS — M25.561 PAIN IN BOTH KNEES, UNSPECIFIED CHRONICITY: ICD-10-CM

## 2025-07-11 ENCOUNTER — OFFICE VISIT (OUTPATIENT)
Age: 70
End: 2025-07-11

## 2025-07-11 VITALS — WEIGHT: 148 LBS | BODY MASS INDEX: 24.66 KG/M2 | HEIGHT: 65 IN

## 2025-07-11 DIAGNOSIS — M25.562 LEFT KNEE PAIN, UNSPECIFIED CHRONICITY: Primary | ICD-10-CM

## 2025-07-11 DIAGNOSIS — M17.12 OSTEOARTHRITIS OF LEFT KNEE, UNSPECIFIED OSTEOARTHRITIS TYPE: ICD-10-CM

## 2025-07-11 RX ORDER — TRIAMCINOLONE ACETONIDE 40 MG/ML
40 INJECTION, SUSPENSION INTRA-ARTICULAR; INTRAMUSCULAR ONCE
Status: COMPLETED | OUTPATIENT
Start: 2025-07-11 | End: 2025-07-11

## 2025-07-11 RX ORDER — LIDOCAINE HYDROCHLORIDE 10 MG/ML
9 INJECTION, SOLUTION INFILTRATION; PERINEURAL ONCE
Status: COMPLETED | OUTPATIENT
Start: 2025-07-11 | End: 2025-07-11

## 2025-07-11 RX ADMIN — TRIAMCINOLONE ACETONIDE 40 MG: 40 INJECTION, SUSPENSION INTRA-ARTICULAR; INTRAMUSCULAR at 12:53

## 2025-07-11 RX ADMIN — LIDOCAINE HYDROCHLORIDE 9 ML: 10 INJECTION, SOLUTION INFILTRATION; PERINEURAL at 12:52

## (undated) DEVICE — ATTUNE SOLO PINNING SYSTEM

## (undated) DEVICE — GLOVE SURG SZ 65 L12IN FNGR THK79MIL GRN LTX FREE

## (undated) DEVICE — STERILE POLYISOPRENE POWDER-FREE SURGICAL GLOVES: Brand: PROTEXIS

## (undated) DEVICE — GLOVE SURG SZ 55 THK91MIL ORANGE  LTX FREE SYN POLYISOPRENE

## (undated) DEVICE — SUTURE VCRL SZ 2-0 L27IN ABSRB UD L26MM CT-2 1/2 CIR J269H

## (undated) DEVICE — TOTAL KNEE PACK: Brand: MEDLINE INDUSTRIES, INC.

## (undated) DEVICE — STERILE POLYISOPRENE POWDER-FREE SURGICAL GLOVES WITH EMOLLIENT COATING: Brand: PROTEXIS

## (undated) DEVICE — UNDERGLOVE SURG SZ 7.5 BLU LTX FREE SYN POLYISOPRENE

## (undated) DEVICE — RECIPROCATING BLADE HEAVY DUTY LONG, OFFSET  (77.6 X 0.77 X 11.2MM)

## (undated) DEVICE — SPONGE LAP SOFT 18X18 IN X RAY DETECTABLE

## (undated) DEVICE — SHEET,DRAPE,70X100,STERILE: Brand: MEDLINE

## (undated) DEVICE — SUTURE MCRYL SZ 3-0 L27IN ABSRB UD L24MM PS-1 3/8 CIR PRIM Y936H

## (undated) DEVICE — GLOVE SURG 7 BIOGEL PI ULTRATOUCH G

## (undated) DEVICE — GOWN,AURORA,FABRIC-REINFORCED,X-LARGE: Brand: MEDLINE

## (undated) DEVICE — BNDG,ELSTC,MATRIX,STRL,6"X5YD,LF,HOOK&LP: Brand: MEDLINE

## (undated) DEVICE — GARMENT COMPR M FOR 13IN FT INTMIT SGL BLDR HEM FORC II

## (undated) DEVICE — 3M™ IOBAN™ 2 ANTIMICROBIAL INCISE DRAPE 6650EZ: Brand: IOBAN™ 2

## (undated) DEVICE — HYPODERMIC SAFETY NEEDLE: Brand: MAGELLAN

## (undated) DEVICE — 450 ML BOTTLE OF 0.05% CHLORHEXIDINE GLUCONATE IN 99.95% STERILE WATER FOR IRRIGATION, USP AND APPLICATOR.: Brand: IRRISEPT ANTIMICROBIAL WOUND LAVAGE

## (undated) DEVICE — BLADE ELECTRODE: Brand: VALLEYLAB

## (undated) DEVICE — GOWN,SIRUS,FABRNF,XL,20/CS: Brand: MEDLINE

## (undated) DEVICE — HOOD WITH PEEL AWAY FACE SHIELD: Brand: T7PLUS

## (undated) DEVICE — ZIMMER® STERILE DISPOSABLE TOURNIQUET CUFF WITH PLC, DUAL PORT, SINGLE BLADDER, 34 IN. (86 CM)

## (undated) DEVICE — (D)PREP SKN CHLRAPRP APPL 26ML -- CONVERT TO ITEM 371833

## (undated) DEVICE — SUTURE VCRL SZ 0 L27IN ABSRB UD L36MM CT-1 1/2 CIR J260H

## (undated) DEVICE — GLOVE SURG SZ 65 THK91MIL LTX FREE SYN POLYISOPRENE

## (undated) DEVICE — STRYKER PERFORMANCE SERIES SAGITTAL BLADE: Brand: STRYKER PERFORMANCE SERIES

## (undated) DEVICE — INTENDED FOR TISSUE SEPARATION, AND OTHER PROCEDURES THAT REQUIRE A SHARP SURGICAL BLADE TO PUNCTURE OR CUT.: Brand: BARD-PARKER SAFETY BLADES SIZE 10, STERILE

## (undated) DEVICE — 4-PORT MANIFOLD: Brand: NEPTUNE 2

## (undated) DEVICE — BOWL BNE CEM MIX SPAT CURET SMARTMIX CTS

## (undated) DEVICE — SUTURE VCRL SZ 1 L27IN ABSRB UD CT-1 L36MM 1/2 CIR J261H

## (undated) DEVICE — WRAP KNEE UNIV E STRP HK AND LOOP W/ GEL PK MEDCOOL

## (undated) DEVICE — 3M™ TEGADERM™ HP TRANSPARENT FILM DRESSING FRAME STYLE, 9546HP, 4 IN X 4-1/2 IN (10 CM X 11.5 CM), 50/CT 4CT/CASE: Brand: 3M™ TEGADERM™

## (undated) DEVICE — SOL IRR NACL 0.9% 500ML POUR --

## (undated) DEVICE — DRAPE,TOP,102X53,STERILE: Brand: MEDLINE

## (undated) DEVICE — DRESSING SURG 4X14 IN POSTOP SIL BORD MEPILEX

## (undated) DEVICE — SKIN CLOS DERMABND PRINEO 60CM -- DERMABOUND PRINEO

## (undated) DEVICE — (D)HANDPIECE IRR W/HI FLO TIP -- DUPLICATE USE ITEM 121586

## (undated) DEVICE — DRESSING FOAM W4XL12IN AG SIL ADH ANTIMIC POSTOP OPTIFOAM